# Patient Record
Sex: FEMALE | Race: WHITE | NOT HISPANIC OR LATINO | Employment: OTHER | URBAN - METROPOLITAN AREA
[De-identification: names, ages, dates, MRNs, and addresses within clinical notes are randomized per-mention and may not be internally consistent; named-entity substitution may affect disease eponyms.]

---

## 2017-05-17 ENCOUNTER — TRANSCRIBE ORDERS (OUTPATIENT)
Dept: ADMINISTRATIVE | Facility: HOSPITAL | Age: 52
End: 2017-05-17

## 2017-05-17 DIAGNOSIS — Z12.31 VISIT FOR SCREENING MAMMOGRAM: Primary | ICD-10-CM

## 2017-05-24 ENCOUNTER — HOSPITAL ENCOUNTER (OUTPATIENT)
Dept: MAMMOGRAPHY | Facility: HOSPITAL | Age: 52
Discharge: HOME | End: 2017-05-24
Attending: OBSTETRICS & GYNECOLOGY | Admitting: OBSTETRICS & GYNECOLOGY

## 2017-05-24 DIAGNOSIS — Z12.31 VISIT FOR SCREENING MAMMOGRAM: ICD-10-CM

## 2017-05-24 PROCEDURE — G0202 SCR MAMMO BI INCL CAD: HCPCS

## 2017-05-24 PROCEDURE — 77063 BREAST TOMOSYNTHESIS BI: CPT

## 2017-05-24 PROCEDURE — 77067 SCR MAMMO BI INCL CAD: CPT | Performed by: RADIOLOGY

## 2017-05-24 PROCEDURE — 77063 BREAST TOMOSYNTHESIS BI: CPT | Performed by: RADIOLOGY

## 2018-04-25 ENCOUNTER — TRANSCRIBE ORDERS (OUTPATIENT)
Dept: ADMINISTRATIVE | Facility: HOSPITAL | Age: 53
End: 2018-04-25

## 2018-04-25 DIAGNOSIS — Z12.31 VISIT FOR SCREENING MAMMOGRAM: Primary | ICD-10-CM

## 2018-05-25 ENCOUNTER — HOSPITAL ENCOUNTER (OUTPATIENT)
Dept: MAMMOGRAPHY | Facility: HOSPITAL | Age: 53
Discharge: HOME | End: 2018-05-25
Admitting: FAMILY MEDICINE

## 2018-05-25 DIAGNOSIS — Z12.31 VISIT FOR SCREENING MAMMOGRAM: ICD-10-CM

## 2018-05-25 PROCEDURE — 77063 BREAST TOMOSYNTHESIS BI: CPT

## 2018-05-25 PROCEDURE — 77067 SCR MAMMO BI INCL CAD: CPT | Performed by: RADIOLOGY

## 2018-05-25 PROCEDURE — 77067 SCR MAMMO BI INCL CAD: CPT

## 2018-05-25 PROCEDURE — 77063 BREAST TOMOSYNTHESIS BI: CPT | Performed by: RADIOLOGY

## 2019-01-13 ENCOUNTER — OFFICE VISIT (OUTPATIENT)
Dept: RETAIL CLINIC | Facility: CLINIC | Age: 54
End: 2019-01-13

## 2019-01-13 VITALS
SYSTOLIC BLOOD PRESSURE: 110 MMHG | WEIGHT: 114 LBS | DIASTOLIC BLOOD PRESSURE: 70 MMHG | TEMPERATURE: 97.4 F | HEIGHT: 64 IN | RESPIRATION RATE: 12 BRPM | OXYGEN SATURATION: 98 % | BODY MASS INDEX: 19.46 KG/M2 | HEART RATE: 100 BPM

## 2019-01-13 DIAGNOSIS — J01.41 ACUTE RECURRENT PANSINUSITIS: Primary | ICD-10-CM

## 2019-01-13 PROCEDURE — 99213 OFFICE O/P EST LOW 20 MIN: CPT | Performed by: NURSE PRACTITIONER

## 2019-01-13 RX ORDER — PSEUDOEPHEDRINE HCL 120 MG/1
120 TABLET, FILM COATED, EXTENDED RELEASE ORAL EVERY 12 HOURS
Qty: 20 TABLET | Refills: 0 | Status: SHIPPED | OUTPATIENT
Start: 2019-01-13 | End: 2019-01-23

## 2019-01-13 RX ORDER — PREDNISONE 10 MG/1
TABLET ORAL
Qty: 21 TABLET | Refills: 0 | Status: SHIPPED | OUTPATIENT
Start: 2019-01-13 | End: 2019-11-20

## 2019-01-13 RX ORDER — AMOXICILLIN 500 MG/1
1000 TABLET, FILM COATED ORAL 2 TIMES DAILY
Qty: 40 TABLET | Refills: 0 | Status: SHIPPED | OUTPATIENT
Start: 2019-01-13 | End: 2019-01-23

## 2019-01-13 NOTE — PROGRESS NOTES
"Subjective   Toya Apple is a 53 y.o. female.     Sinus Problem   This is a recurrent problem. Episode onset: 2 weeks. The problem has been gradually worsening since onset. There has been no fever. The pain is severe. Associated symptoms include congestion, headaches, sinus pressure and swollen glands. Pertinent negatives include no chills, coughing, ear pain, hoarse voice, neck pain, shortness of breath, sneezing or sore throat. Past treatments include nothing.        The following portions of the patient's history were reviewed and updated as appropriate: allergies, current medications, past medical history, past social history, past surgical history and problem list.    Review of Systems   Constitutional: Negative for appetite change, chills and fever.   HENT: Positive for congestion, postnasal drip, rhinorrhea, sinus pressure and sinus pain. Negative for ear pain, hoarse voice, sneezing, sore throat and trouble swallowing.    Eyes: Negative.    Respiratory: Negative for cough, shortness of breath and wheezing.    Cardiovascular: Negative.    Gastrointestinal: Negative for abdominal pain, diarrhea, nausea and vomiting.   Musculoskeletal: Negative.  Negative for neck pain.   Skin: Negative.    Neurological: Positive for headaches. Negative for dizziness.   Hematological: Positive for adenopathy.        /70   Pulse 100   Temp 97.4 °F (36.3 °C) (Oral)   Resp 12   Ht 162.6 cm (64\")   Wt 51.7 kg (114 lb)   LMP  (LMP Unknown)   SpO2 98%   BMI 19.57 kg/m²      Objective   Physical Exam   Constitutional: She is oriented to person, place, and time. Vital signs are normal. She appears well-developed and well-nourished.   HENT:   Head: Normocephalic.   Right Ear: External ear and ear canal normal. No drainage, swelling or tenderness. Tympanic membrane is bulging. Tympanic membrane is not erythematous.   Left Ear: External ear and ear canal normal. No drainage, swelling or tenderness. Tympanic membrane is " bulging. Tympanic membrane is not erythematous.   Nose: Mucosal edema and rhinorrhea present. Right sinus exhibits maxillary sinus tenderness and frontal sinus tenderness. Left sinus exhibits maxillary sinus tenderness and frontal sinus tenderness.   Mouth/Throat: Uvula is midline, oropharynx is clear and moist and mucous membranes are normal. Tonsils are 0 on the right. Tonsils are 0 on the left. No tonsillar exudate.   Eyes: Conjunctivae are normal. Pupils are equal, round, and reactive to light.   Cardiovascular: Normal rate, regular rhythm, S1 normal, S2 normal and normal heart sounds.   Pulmonary/Chest: Effort normal and breath sounds normal. No respiratory distress. She has no wheezes.   Lymphadenopathy:        Head (right side): Tonsillar adenopathy present.        Head (left side): Tonsillar adenopathy present.     She has no cervical adenopathy.   Neurological: She is alert and oriented to person, place, and time.   Skin: Skin is warm, dry and intact. No rash noted.   Psychiatric: She has a normal mood and affect. Her speech is normal and behavior is normal. Thought content normal.   Vitals reviewed.       Assessment/Plan   Toya was seen today for sinus problem.    Diagnoses and all orders for this visit:    Acute recurrent pansinusitis  -     amoxicillin (AMOXIL) 500 MG tablet; Take 2 tablets by mouth 2 (Two) Times a Day for 10 days.  -     pseudoephedrine (SUDAFED) 120 MG 12 hr tablet; Take 1 tablet by mouth Every 12 (Twelve) Hours for 10 days.  -     predniSONE (DELTASONE) 10 MG tablet; 6 tabs po x 1 day/5/4/3/2/1/stop; tapering dose x 6 days

## 2019-04-18 ENCOUNTER — TRANSCRIBE ORDERS (OUTPATIENT)
Dept: ADMINISTRATIVE | Facility: HOSPITAL | Age: 54
End: 2019-04-18

## 2019-04-18 DIAGNOSIS — Z12.31 VISIT FOR SCREENING MAMMOGRAM: Primary | ICD-10-CM

## 2019-05-29 ENCOUNTER — HOSPITAL ENCOUNTER (OUTPATIENT)
Dept: MAMMOGRAPHY | Facility: HOSPITAL | Age: 54
Discharge: HOME | End: 2019-05-29
Admitting: FAMILY MEDICINE

## 2019-05-29 DIAGNOSIS — Z12.31 VISIT FOR SCREENING MAMMOGRAM: ICD-10-CM

## 2019-05-29 PROCEDURE — 77063 BREAST TOMOSYNTHESIS BI: CPT | Performed by: RADIOLOGY

## 2019-05-29 PROCEDURE — 77063 BREAST TOMOSYNTHESIS BI: CPT

## 2019-05-29 PROCEDURE — 77067 SCR MAMMO BI INCL CAD: CPT | Performed by: RADIOLOGY

## 2019-05-29 PROCEDURE — 77067 SCR MAMMO BI INCL CAD: CPT

## 2019-11-20 ENCOUNTER — OFFICE VISIT (OUTPATIENT)
Dept: ORTHOPEDIC SURGERY | Facility: CLINIC | Age: 54
End: 2019-11-20

## 2019-11-20 VITALS — HEART RATE: 85 BPM | HEIGHT: 64 IN | WEIGHT: 105.82 LBS | OXYGEN SATURATION: 98 % | BODY MASS INDEX: 18.07 KG/M2

## 2019-11-20 DIAGNOSIS — M77.42 METATARSALGIA OF BOTH FEET: Primary | ICD-10-CM

## 2019-11-20 DIAGNOSIS — M72.2 PLANTAR FASCIITIS: ICD-10-CM

## 2019-11-20 DIAGNOSIS — M77.41 METATARSALGIA OF BOTH FEET: Primary | ICD-10-CM

## 2019-11-20 PROCEDURE — 99204 OFFICE O/P NEW MOD 45 MIN: CPT | Performed by: ORTHOPAEDIC SURGERY

## 2019-11-20 NOTE — PROGRESS NOTES
NEW PATIENT    Patient: Toya Apple  : 1965    Primary Care Provider: Brian Tillman MD    Requesting Provider: As above    Pain of the Right Foot      History    Chief Complaint: Bilateral foot pain    History of Present Illness: This is an extremely pleasant 54-year-old woman here for a 4th opinion regarding bilateral forefoot pain.  She reports that the problem started in 2018.  It started with pain on the right, under the metatarsal heads.  She did not have any particular injury.  She is a triathlete, and initially continued training in all 3 sports.  She tried some metatarsal pads that she actually stuck on her foot, and they helped initially.  But by May she was much worse.  She had much more pain barefoot, better but not resolved with padding.  She stopped running in .  She has continued to swim and bike.  She saw podiatrist in , who gave her some inserts, but they had a very thin pad under the metatarsal heads and did not help.  X-rays at that time standing 3 views of the foot done 2019 are unremarkable, I reviewed them on a disc.  She had an MRI done on 2019.  I reviewed the films and the report.  There is very subtle edema in the second metatarsal head, consistent with stress there, but not a stress fracture, no tears in the plantar plate, this is consistent with metatarsalgia.  She had an injection in the first webspace, she noted some skin atrophy and dorsal fat atrophy after that.  The injection did not change her symptoms.  In August she saw Dr. Gordo Colon at  in sports medicine.  And he recommended orthotics.  She has been advised that she has a very thin fat pad on the feet.  She also has tried dry needling, physical therapy, icing and acupuncture.  She also saw Dr. Crow in  sports medicine.  He recommended orthotics for a month, and then he would consider repeating the MRI.  She got new orthotics at the [facility] [place], but 3 days later noted  some right heel pain.  She thought it might be due to the orthotics so she changed back to the older ones which had been modified to place more padding and more metatarsal posting.  Those of the most comfortable.  She does not have morning pain, the heel pain is in the area of the origin of the plantar fascia.  She would like to be able to run again, she would like to know if there is anything else she can do, she would like to know if she needs a repeat MRI.  She has similar but milder pain in the left forefoot.  Pain is under metatarsal heads 2 through 5, but most concentrated under the second and third.  She has not had any swelling, no significant hammertoe deformity.  She does not have tight hamstrings and heel cords.  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    Current Outpatient Medications on File Prior to Visit   Medication Sig Dispense Refill   • [DISCONTINUED] predniSONE (DELTASONE) 10 MG tablet 6 tabs po x 1 day/5/4/3/2/1/stop; tapering dose x 6 days 21 tablet 0     No current facility-administered medications on file prior to visit.       No Known Allergies   History reviewed. No pertinent past medical history.  History reviewed. No pertinent surgical history.  Family History   Adopted: Yes   Problem Relation Age of Onset   • No Known Problems Mother    • No Known Problems Father    • Breast cancer Neg Hx    • Ovarian cancer Neg Hx    • Endometrial cancer Neg Hx       Social History     Socioeconomic History   • Marital status:      Spouse name: Not on file   • Number of children: Not on file   • Years of education: Not on file   • Highest education level: Not on file   Tobacco Use   • Smoking status: Never Smoker   • Smokeless tobacco: Never Used   Substance and Sexual Activity   • Alcohol use: No     Frequency: Never   • Drug use: No   • Sexual activity: Defer        Review of Systems   Constitutional: Positive for activity change.   HENT:  "Negative.    Eyes: Negative.    Respiratory: Negative.    Cardiovascular: Negative.    Gastrointestinal: Negative.    Endocrine: Negative.    Genitourinary: Negative.    Musculoskeletal: Positive for joint swelling.   Skin: Negative.    Allergic/Immunologic: Negative.    Neurological: Negative.    Hematological: Negative.    Psychiatric/Behavioral: Negative.        The following portions of the patient's history were reviewed and updated as appropriate: allergies, current medications, past family history, past medical history, past social history, past surgical history and problem list.    Physical Exam:   Pulse 85   Ht 162.6 cm (64.02\")   Wt 48 kg (105 lb 13.1 oz)   LMP  (LMP Unknown)   SpO2 98%   BMI 18.15 kg/m²   GENERAL: Body habitus: Very thin    Lower extremity edema: Right: none; Left: none    Varicose veins:  Right: none; Left: none    Gait: normal     Mental Status:  awake and alert; oriented to person, place, and time    Voice:  clear  SKIN:  Lower extremity: Normal and warm and dry    Hair Growth(lower extremity):  Right:normal; Left:  normal  NAILS: Toenails: normal  HEENT: Head: Normocephalic, atraumatic,  without obvious abnormality.  eye: normal external eye, no icterus  ears:normal external ears  nose: normal external nose  pharynx: dental hygiene adequate  PULM:  Repiratory effort normal  CV:  Dorsalis Pedis:  Right: 2+; Left:2+    Posterior Tibial: Right:2+; Left:2+    Capillary Refill:  Brisk  MSK:  Hand:right handed      Tibia:  Right:  non tender; Left:  non tender      Ankle:  Right: non tender, ROM  normal and symmetric and motor function  normal; Left:  non tender, ROM  normal and symmetric and motor function  normal      Foot:  Right:  Tender under metatarsal heads 2 through 5, especially tender under the second and third, no swelling, no hammertoe deformity, very thin fat pad.  Also mildly tender at the origin of the plantar fascia.  No pain with squeeze of the heel.  Otherwise " "nontender; Left:  Tender under the metatarsal heads, especially 2 and 3, no swelling, no hammertoes, very thin atrophic fat pad    NEURO: Heel Walking:  Right:  normal; Left:  normal    Toe Walking:  Right:  Able to do this with pain under the metatarsal heads; Left:  Able to do this with mild pain under the metatarsal heads     Kirkwood-Pito 5.07 monofilament test: normal    Lower extremity sensation: intact     Reflexes:  Biceps:  Right:  2+; Left:  2+           Quads:  Right:  2+; Left:  2+           Ankle:  Right:  1+; Left:  1+      Calf Atrophy:none    Motor Function: all 5/5         Medical Decision Making    Data Review:   reviewed radiology images, reviewed radiology results and reviewed outside records    Assessment and Plan/ Diagnosis/Treatment options:   1. Metatarsalgia of both feet  I definitely agree this is metatarsalgia.  This is not synovitis, I do not think it is early hammertoe deformity, I think it is metatarsalgia due to a very thin fat pad.  I explained that our feet change with time.  Losing the fat pad is most likely due to genetics.  I explained that some people lose the padding more than others.  It may be that she is essentially \"stuck with this\".  I explained there is no way to increase padding in the foot, various fillers have been tried with no success.  The best way to do this is with external padding.  It may mean that she needs to try quite a few different brands of shoes and pads.  I showed her how to use a felt metatarsal pad in the shoe rather than on her foot.  I also showed her how to use a Budin splint, one loop or 2 loops.  Its unfortunately possible that she might not be able to get back to running.  She has some new [employer] shoes coming, that certainly reasonable to try, and she was thinking about trying the brand of shoes that the marathon runner who came in under 2 hours wears.  We also talked about gel pads.  There is no surgery that I have that can change this for her.  " I do not think that a repeat MRI will be useful.  She agrees, she would rather avoid the expense.  We talked about other ways to cross train.  I evaluated her orthotics in the shoes she brought.  She might consider having the metatarsal pads moved a little bit more distally.    2. Plantar fasciitis  I think the heel pain is plantar fasciitis.  Its not entirely classic but we do not always have all the symptoms.  I recommended stretching and went over the patient information sheet with her.  If she develops morning pain I would recommend that she obtain a night splint.  I will be happy to see her anytime.            Radiology Ordered []  Radiology Reports Reviewed []      Radiology Images Reviewed []   Labs Reviewed []    Labs Ordered []   PCP Records Reviewed []    Provider Records Reviewed []    ER Records Reviewed []    Hospital Records Reviewed []    History Obtained From Family []    Phone conversation with Provider []    Records Requested []

## 2020-05-08 ENCOUNTER — HOSPITAL ENCOUNTER (EMERGENCY)
Facility: HOSPITAL | Age: 55
Discharge: HOME | End: 2020-05-08
Attending: EMERGENCY MEDICINE | Admitting: EMERGENCY MEDICINE

## 2020-05-08 VITALS
SYSTOLIC BLOOD PRESSURE: 140 MMHG | TEMPERATURE: 99.5 F | HEART RATE: 80 BPM | HEIGHT: 64 IN | DIASTOLIC BLOOD PRESSURE: 92 MMHG | WEIGHT: 108 LBS | RESPIRATION RATE: 14 BRPM | BODY MASS INDEX: 18.44 KG/M2 | OXYGEN SATURATION: 94 %

## 2020-05-08 DIAGNOSIS — R06.4 HYPERVENTILATION: ICD-10-CM

## 2020-05-08 DIAGNOSIS — R03.0 ELEVATED BLOOD-PRESSURE READING WITHOUT DIAGNOSIS OF HYPERTENSION: ICD-10-CM

## 2020-05-08 DIAGNOSIS — R06.02 SHORTNESS OF BREATH: Primary | ICD-10-CM

## 2020-05-08 PROCEDURE — 99283 EMERGENCY DEPT VISIT LOW MDM: CPT

## 2020-05-08 NOTE — ED PROVIDER NOTES
Subjective   Mrs. Apple presents with difficulty breathing, diffuse numbness, and anxiety.  She tells me her dog ran into her leg a couple of days ago.  She saw Dr. Zheng's assistant yesterday and had a CAT scan which she tells me shows tibial plateau fracture.  She was prescribed oxycodone for pain.  Her last dose was 2:00 this morning.  She was supposed to see him this morning but notes that in the car on the way she was breathing very rapidly and felt numb and tingly all over.  She tells me her breathing has settled down somewhat.  She denies any chronic medical problems.  She denies any other injuries as a result of her collision with the dog.  She specifically denies any head injury.      History provided by:  Patient  Shortness of Breath   Severity:  Severe  Onset quality:  Sudden  Timing:  Intermittent  Progression:  Resolved  Chronicity:  New  Relieved by:  Nothing  Worsened by:  Nothing  Associated symptoms: no abdominal pain, no chest pain, no cough, no fever, no sore throat and no vomiting        Review of Systems   Constitutional: Negative for chills and fever.   HENT: Negative for congestion, rhinorrhea and sore throat.    Respiratory: Positive for shortness of breath. Negative for cough.    Cardiovascular: Negative for chest pain.   Gastrointestinal: Negative for abdominal pain and vomiting.   Neurological: Positive for dizziness and numbness.   Psychiatric/Behavioral: The patient is nervous/anxious.        No past medical history on file.    No Known Allergies    No past surgical history on file.    Family History   Adopted: Yes   Problem Relation Age of Onset   • No Known Problems Mother    • No Known Problems Father    • Breast cancer Neg Hx    • Ovarian cancer Neg Hx    • Endometrial cancer Neg Hx        Social History     Socioeconomic History   • Marital status:      Spouse name: Not on file   • Number of children: Not on file   • Years of education: Not on file   • Highest education  level: Not on file   Tobacco Use   • Smoking status: Never Smoker   • Smokeless tobacco: Never Used   Substance and Sexual Activity   • Alcohol use: No     Frequency: Never   • Drug use: No   • Sexual activity: Defer           Objective   Physical Exam   Constitutional: She appears well-developed and well-nourished. No distress.   HENT:   Head: Normocephalic and atraumatic.   Eyes: Conjunctivae are normal. No scleral icterus.   Neck: Normal range of motion. Neck supple. No JVD present.   Cardiovascular: Normal rate and regular rhythm. Exam reveals no friction rub.   No murmur heard.  Pulmonary/Chest: Effort normal and breath sounds normal. No stridor. No respiratory distress. She has no wheezes. She has no rales.   Musculoskeletal: She exhibits no edema or tenderness.   Her left leg is in a knee immobilizer.  Sensation is intact distally.  Capillary refill is a little delayed in both feet but is symmetric.  There is no calf tenderness or swelling on either side.   Neurological: She is alert.   Skin: Skin is warm and dry. No pallor.   Nursing note and vitals reviewed.      Procedures           ED Course  ED Course as of May 08 0952   Fri May 08, 2020   0927 I viewed the disc that Mrs. Apple brought with her.  There is no report.  There are only 2 poor quality  images.  I do not see an obvious fracture on those.    [DT]   0927 I spoke with Mrs. Apple about my plan to perform EKG, blood work and possibly CAT scan.  She tells me she does not want any of that done.  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  She tells me it is all coming from the pain in her leg.  I offered medication for anxiety and she declined.  I offered pain medication and she declined.  I advised her of the possibility that this could be related to oxycodone and she feels like she was having similar episodes before she took that.  When I asked what we can do for her currently as she would not  allow us to do any further work-up she tells me she just wants to be discharged so she can go see Dr. coleman.    [DT]      ED Course User Index  [DT] Rufus Monge MD                                           [facility]    Final diagnoses:   Shortness of breath   Hyperventilation   Elevated blood-pressure reading without diagnosis of hypertension            Rufus Mogne MD  05/08/20 0995

## 2020-05-12 ENCOUNTER — OFFICE VISIT (OUTPATIENT)
Dept: PREADMISSION TESTING | Facility: HOSPITAL | Age: 55
End: 2020-05-12

## 2020-05-12 PROCEDURE — C9803 HOPD COVID-19 SPEC COLLECT: HCPCS

## 2020-05-12 PROCEDURE — U0002 COVID-19 LAB TEST NON-CDC: HCPCS

## 2020-05-12 PROCEDURE — U0004 COV-19 TEST NON-CDC HGH THRU: HCPCS

## 2020-05-13 ENCOUNTER — ANESTHESIA EVENT (OUTPATIENT)
Dept: PERIOP | Facility: HOSPITAL | Age: 55
End: 2020-05-13

## 2020-05-13 LAB
REF LAB TEST METHOD: NORMAL
SARS-COV-2 RNA RESP QL NAA+PROBE: NOT DETECTED

## 2020-05-14 ENCOUNTER — HOSPITAL ENCOUNTER (OUTPATIENT)
Facility: HOSPITAL | Age: 55
Setting detail: SURGERY ADMIT
Discharge: HOME | End: 2020-05-14
Attending: ORTHOPAEDIC SURGERY | Admitting: ORTHOPAEDIC SURGERY

## 2020-05-14 ENCOUNTER — ANESTHESIA (OUTPATIENT)
Dept: PERIOP | Facility: HOSPITAL | Age: 55
End: 2020-05-14

## 2020-05-14 ENCOUNTER — APPOINTMENT (OUTPATIENT)
Dept: GENERAL RADIOLOGY | Facility: HOSPITAL | Age: 55
End: 2020-05-14

## 2020-05-14 VITALS
WEIGHT: 108 LBS | OXYGEN SATURATION: 99 % | HEIGHT: 64 IN | SYSTOLIC BLOOD PRESSURE: 118 MMHG | HEART RATE: 83 BPM | DIASTOLIC BLOOD PRESSURE: 53 MMHG | BODY MASS INDEX: 18.44 KG/M2 | TEMPERATURE: 98 F | RESPIRATION RATE: 16 BRPM

## 2020-05-14 DIAGNOSIS — S82.122A CLOSED FRACTURE OF LATERAL PORTION OF LEFT TIBIAL PLATEAU, INITIAL ENCOUNTER: Primary | ICD-10-CM

## 2020-05-14 LAB
B-HCG UR QL: NEGATIVE
DEPRECATED RDW RBC AUTO: 37.9 FL (ref 37–54)
ERYTHROCYTE [DISTWIDTH] IN BLOOD BY AUTOMATED COUNT: 11.3 % (ref 12.3–15.4)
HCT VFR BLD AUTO: 43.2 % (ref 34–46.6)
HGB BLD-MCNC: 15 G/DL (ref 12–15.9)
INTERNAL NEGATIVE CONTROL: NEGATIVE
INTERNAL POSITIVE CONTROL: POSITIVE
Lab: NORMAL
MCH RBC QN AUTO: 31.6 PG (ref 26.6–33)
MCHC RBC AUTO-ENTMCNC: 34.7 G/DL (ref 31.5–35.7)
MCV RBC AUTO: 90.9 FL (ref 79–97)
PLATELET # BLD AUTO: 217 10*3/MM3 (ref 140–450)
PMV BLD AUTO: 9.7 FL (ref 6–12)
RBC # BLD AUTO: 4.75 10*6/MM3 (ref 3.77–5.28)
WBC NRBC COR # BLD: 5.21 10*3/MM3 (ref 3.4–10.8)

## 2020-05-14 PROCEDURE — 25010000002 FENTANYL CITRATE (PF) 100 MCG/2ML SOLUTION: Performed by: ANESTHESIOLOGY

## 2020-05-14 PROCEDURE — 25010000002 PROPOFOL 10 MG/ML EMULSION: Performed by: NURSE ANESTHETIST, CERTIFIED REGISTERED

## 2020-05-14 PROCEDURE — L1833 KO ADJ JNT POS R SUP PRE OTS: HCPCS | Performed by: ORTHOPAEDIC SURGERY

## 2020-05-14 PROCEDURE — C1713 ANCHOR/SCREW BN/BN,TIS/BN: HCPCS | Performed by: ORTHOPAEDIC SURGERY

## 2020-05-14 PROCEDURE — 76000 FLUOROSCOPY <1 HR PHYS/QHP: CPT

## 2020-05-14 PROCEDURE — 25010000002 ONDANSETRON PER 1 MG: Performed by: NURSE ANESTHETIST, CERTIFIED REGISTERED

## 2020-05-14 PROCEDURE — 25010000002 DEXAMETHASONE SODIUM PHOSPHATE 10 MG/ML SOLUTION: Performed by: ANESTHESIOLOGY

## 2020-05-14 PROCEDURE — C1769 GUIDE WIRE: HCPCS | Performed by: ORTHOPAEDIC SURGERY

## 2020-05-14 PROCEDURE — 25010000002 BUPRENORPHINE PER 0.1 MG: Performed by: ANESTHESIOLOGY

## 2020-05-14 PROCEDURE — 85027 COMPLETE CBC AUTOMATED: CPT | Performed by: ANESTHESIOLOGY

## 2020-05-14 PROCEDURE — 81025 URINE PREGNANCY TEST: CPT | Performed by: ANESTHESIOLOGY

## 2020-05-14 PROCEDURE — 25010000002 ROPIVACAINE PER 1 MG: Performed by: NURSE ANESTHETIST, CERTIFIED REGISTERED

## 2020-05-14 PROCEDURE — 25010000002 DEXAMETHASONE PER 1 MG: Performed by: NURSE ANESTHETIST, CERTIFIED REGISTERED

## 2020-05-14 PROCEDURE — 25010000003 CEFAZOLIN IN DEXTROSE 2-4 GM/100ML-% SOLUTION: Performed by: ORTHOPAEDIC SURGERY

## 2020-05-14 DEVICE — SUT FW #2 W/TPR NDL 1/2 CIR 38IN 97CM 26.5MM BLU: Type: IMPLANTABLE DEVICE | Site: TIBIA | Status: FUNCTIONAL

## 2020-05-14 DEVICE — DEV CONTRL TISS STRATAFIX SPIRAL MNCRYL UD 3/0 PLS 30CM: Type: IMPLANTABLE DEVICE | Site: TIBIA | Status: FUNCTIONAL

## 2020-05-14 DEVICE — SCRW CORT S/TAP 3.5X36MM: Type: IMPLANTABLE DEVICE | Site: TIBIA | Status: FUNCTIONAL

## 2020-05-14 DEVICE — IMPLANTABLE DEVICE: Type: IMPLANTABLE DEVICE | Site: TIBIA | Status: FUNCTIONAL

## 2020-05-14 DEVICE — SCRW LK VA/LCP S/TAP STRDRV 3.5X56MM: Type: IMPLANTABLE DEVICE | Site: TIBIA | Status: FUNCTIONAL

## 2020-05-14 DEVICE — BONE FILL NORIAN INJ/DRL 5CC STRL: Type: IMPLANTABLE DEVICE | Site: TIBIA | Status: FUNCTIONAL

## 2020-05-14 DEVICE — SCRW LK VA/LCP S/TAP STRDRV 3.5X60MM: Type: IMPLANTABLE DEVICE | Site: TIBIA | Status: FUNCTIONAL

## 2020-05-14 DEVICE — SCRW CORT S/TAP 3.5X40MM: Type: IMPLANTABLE DEVICE | Site: TIBIA | Status: FUNCTIONAL

## 2020-05-14 DEVICE — SCRW LK VA/LCP S/TAP STRDRV 3.5X65MM: Type: IMPLANTABLE DEVICE | Site: TIBIA | Status: FUNCTIONAL

## 2020-05-14 DEVICE — SCRW LK VA/LCP S/TAP STRDRV 3.5X50MM: Type: IMPLANTABLE DEVICE | Site: TIBIA | Status: FUNCTIONAL

## 2020-05-14 DEVICE — SCRW LK VA/LCP S/TAP STRDRV 3.5X70MM: Type: IMPLANTABLE DEVICE | Site: TIBIA | Status: FUNCTIONAL

## 2020-05-14 RX ORDER — PROPOFOL 10 MG/ML
VIAL (ML) INTRAVENOUS AS NEEDED
Status: DISCONTINUED | OUTPATIENT
Start: 2020-05-14 | End: 2020-05-14 | Stop reason: SURG

## 2020-05-14 RX ORDER — SODIUM CHLORIDE 0.9 % (FLUSH) 0.9 %
10 SYRINGE (ML) INJECTION AS NEEDED
Status: DISCONTINUED | OUTPATIENT
Start: 2020-05-14 | End: 2020-05-14 | Stop reason: HOSPADM

## 2020-05-14 RX ORDER — DEXAMETHASONE SODIUM PHOSPHATE 4 MG/ML
INJECTION, SOLUTION INTRA-ARTICULAR; INTRALESIONAL; INTRAMUSCULAR; INTRAVENOUS; SOFT TISSUE AS NEEDED
Status: DISCONTINUED | OUTPATIENT
Start: 2020-05-14 | End: 2020-05-14 | Stop reason: SURG

## 2020-05-14 RX ORDER — DEXAMETHASONE SODIUM PHOSPHATE 10 MG/ML
INJECTION, SOLUTION INTRAMUSCULAR; INTRAVENOUS
Status: COMPLETED | OUTPATIENT
Start: 2020-05-14 | End: 2020-05-14

## 2020-05-14 RX ORDER — FENTANYL CITRATE 50 UG/ML
50 INJECTION, SOLUTION INTRAMUSCULAR; INTRAVENOUS
Status: DISCONTINUED | OUTPATIENT
Start: 2020-05-14 | End: 2020-05-14 | Stop reason: HOSPADM

## 2020-05-14 RX ORDER — FAMOTIDINE 20 MG/1
20 TABLET, FILM COATED ORAL ONCE
Status: COMPLETED | OUTPATIENT
Start: 2020-05-14 | End: 2020-05-14

## 2020-05-14 RX ORDER — ASPIRIN 325 MG
325 TABLET, DELAYED RELEASE (ENTERIC COATED) ORAL DAILY
Qty: 14 TABLET | Refills: 0 | Status: SHIPPED | OUTPATIENT
Start: 2020-05-14 | End: 2021-12-02

## 2020-05-14 RX ORDER — OXYCODONE HCL 10 MG/1
5 TABLET, FILM COATED, EXTENDED RELEASE ORAL EVERY 12 HOURS
COMMUNITY
End: 2021-12-02

## 2020-05-14 RX ORDER — PROMETHAZINE HYDROCHLORIDE 25 MG/ML
6.25 INJECTION, SOLUTION INTRAMUSCULAR; INTRAVENOUS ONCE AS NEEDED
Status: DISCONTINUED | OUTPATIENT
Start: 2020-05-14 | End: 2020-05-14 | Stop reason: HOSPADM

## 2020-05-14 RX ORDER — SODIUM CHLORIDE, SODIUM LACTATE, POTASSIUM CHLORIDE, CALCIUM CHLORIDE 600; 310; 30; 20 MG/100ML; MG/100ML; MG/100ML; MG/100ML
9 INJECTION, SOLUTION INTRAVENOUS CONTINUOUS
Status: DISCONTINUED | OUTPATIENT
Start: 2020-05-14 | End: 2020-05-14 | Stop reason: HOSPADM

## 2020-05-14 RX ORDER — BUPRENORPHINE HYDROCHLORIDE 0.32 MG/ML
INJECTION INTRAMUSCULAR; INTRAVENOUS
Status: COMPLETED | OUTPATIENT
Start: 2020-05-14 | End: 2020-05-14

## 2020-05-14 RX ORDER — HYDROCODONE BITARTRATE AND ACETAMINOPHEN 7.5; 325 MG/1; MG/1
1-2 TABLET ORAL EVERY 4 HOURS PRN
Qty: 30 TABLET | Refills: 0 | Status: SHIPPED | OUTPATIENT
Start: 2020-05-14 | End: 2021-12-02

## 2020-05-14 RX ORDER — ONDANSETRON 2 MG/ML
INJECTION INTRAMUSCULAR; INTRAVENOUS AS NEEDED
Status: DISCONTINUED | OUTPATIENT
Start: 2020-05-14 | End: 2020-05-14 | Stop reason: SURG

## 2020-05-14 RX ORDER — HYDROMORPHONE HYDROCHLORIDE 1 MG/ML
0.5 INJECTION, SOLUTION INTRAMUSCULAR; INTRAVENOUS; SUBCUTANEOUS
Status: DISCONTINUED | OUTPATIENT
Start: 2020-05-14 | End: 2020-05-14 | Stop reason: HOSPADM

## 2020-05-14 RX ORDER — BUPIVACAINE HYDROCHLORIDE 2.5 MG/ML
INJECTION, SOLUTION EPIDURAL; INFILTRATION; INTRACAUDAL
Status: COMPLETED | OUTPATIENT
Start: 2020-05-14 | End: 2020-05-14

## 2020-05-14 RX ORDER — CEFAZOLIN SODIUM 2 G/100ML
2 INJECTION, SOLUTION INTRAVENOUS ONCE
Status: COMPLETED | OUTPATIENT
Start: 2020-05-14 | End: 2020-05-14

## 2020-05-14 RX ORDER — PROMETHAZINE HYDROCHLORIDE 25 MG/1
25 SUPPOSITORY RECTAL ONCE AS NEEDED
Status: DISCONTINUED | OUTPATIENT
Start: 2020-05-14 | End: 2020-05-14 | Stop reason: HOSPADM

## 2020-05-14 RX ORDER — MAGNESIUM HYDROXIDE 1200 MG/15ML
LIQUID ORAL AS NEEDED
Status: DISCONTINUED | OUTPATIENT
Start: 2020-05-14 | End: 2020-05-14 | Stop reason: HOSPADM

## 2020-05-14 RX ORDER — PROMETHAZINE HYDROCHLORIDE 25 MG/1
25 TABLET ORAL ONCE AS NEEDED
Status: DISCONTINUED | OUTPATIENT
Start: 2020-05-14 | End: 2020-05-14 | Stop reason: HOSPADM

## 2020-05-14 RX ORDER — FAMOTIDINE 10 MG/ML
20 INJECTION, SOLUTION INTRAVENOUS ONCE
Status: DISCONTINUED | OUTPATIENT
Start: 2020-05-14 | End: 2020-05-14 | Stop reason: HOSPADM

## 2020-05-14 RX ORDER — LIDOCAINE HYDROCHLORIDE 10 MG/ML
INJECTION, SOLUTION EPIDURAL; INFILTRATION; INTRACAUDAL; PERINEURAL AS NEEDED
Status: DISCONTINUED | OUTPATIENT
Start: 2020-05-14 | End: 2020-05-14 | Stop reason: SURG

## 2020-05-14 RX ORDER — FENTANYL CITRATE 50 UG/ML
INJECTION, SOLUTION INTRAMUSCULAR; INTRAVENOUS
Status: COMPLETED | OUTPATIENT
Start: 2020-05-14 | End: 2020-05-14

## 2020-05-14 RX ORDER — LIDOCAINE HYDROCHLORIDE 10 MG/ML
0.5 INJECTION, SOLUTION EPIDURAL; INFILTRATION; INTRACAUDAL; PERINEURAL ONCE AS NEEDED
Status: COMPLETED | OUTPATIENT
Start: 2020-05-14 | End: 2020-05-14

## 2020-05-14 RX ORDER — SODIUM CHLORIDE 0.9 % (FLUSH) 0.9 %
10 SYRINGE (ML) INJECTION EVERY 12 HOURS SCHEDULED
Status: DISCONTINUED | OUTPATIENT
Start: 2020-05-14 | End: 2020-05-14 | Stop reason: HOSPADM

## 2020-05-14 RX ADMIN — LIDOCAINE HYDROCHLORIDE 30 MG: 10 INJECTION, SOLUTION EPIDURAL; INFILTRATION; INTRACAUDAL; PERINEURAL at 07:34

## 2020-05-14 RX ADMIN — FAMOTIDINE 20 MG: 20 TABLET, FILM COATED ORAL at 06:45

## 2020-05-14 RX ADMIN — EPHEDRINE SULFATE 10 MG: 50 INJECTION INTRAMUSCULAR; INTRAVENOUS; SUBCUTANEOUS at 07:42

## 2020-05-14 RX ADMIN — EPHEDRINE SULFATE 10 MG: 50 INJECTION INTRAMUSCULAR; INTRAVENOUS; SUBCUTANEOUS at 07:41

## 2020-05-14 RX ADMIN — ONDANSETRON 4 MG: 2 INJECTION INTRAMUSCULAR; INTRAVENOUS at 09:04

## 2020-05-14 RX ADMIN — PROPOFOL 100 MG: 10 INJECTION, EMULSION INTRAVENOUS at 07:34

## 2020-05-14 RX ADMIN — FENTANYL CITRATE 100 MCG: 50 INJECTION, SOLUTION INTRAMUSCULAR; INTRAVENOUS at 07:12

## 2020-05-14 RX ADMIN — BUPIVACAINE HYDROCHLORIDE 20 ML: 2.5 INJECTION, SOLUTION EPIDURAL; INFILTRATION; INTRACAUDAL; PERINEURAL at 07:16

## 2020-05-14 RX ADMIN — BUPIVACAINE HYDROCHLORIDE 20 ML: 2.5 INJECTION, SOLUTION EPIDURAL; INFILTRATION; INTRACAUDAL; PERINEURAL at 07:12

## 2020-05-14 RX ADMIN — EPHEDRINE SULFATE 10 MG: 50 INJECTION INTRAMUSCULAR; INTRAVENOUS; SUBCUTANEOUS at 07:38

## 2020-05-14 RX ADMIN — SODIUM CHLORIDE, POTASSIUM CHLORIDE, SODIUM LACTATE AND CALCIUM CHLORIDE 9 ML/HR: 600; 310; 30; 20 INJECTION, SOLUTION INTRAVENOUS at 06:45

## 2020-05-14 RX ADMIN — ROPIVACAINE HYDROCHLORIDE 6 ML/HR: 5 INJECTION, SOLUTION EPIDURAL; INFILTRATION; PERINEURAL at 10:12

## 2020-05-14 RX ADMIN — CEFAZOLIN SODIUM 2 G: 2 INJECTION, SOLUTION INTRAVENOUS at 07:28

## 2020-05-14 RX ADMIN — DEXAMETHASONE SODIUM PHOSPHATE 2 MG: 10 INJECTION INTRAMUSCULAR; INTRAVENOUS at 07:16

## 2020-05-14 RX ADMIN — BUPIVACAINE HYDROCHLORIDE 10 ML: 2.5 INJECTION, SOLUTION EPIDURAL; INFILTRATION; INTRACAUDAL; PERINEURAL at 10:15

## 2020-05-14 RX ADMIN — BUPRENORPHINE HYDROCHLORIDE 0.15 MG: 0.32 INJECTION INTRAMUSCULAR; INTRAVENOUS at 07:16

## 2020-05-14 RX ADMIN — DEXAMETHASONE SODIUM PHOSPHATE 2 MG: 10 INJECTION INTRAMUSCULAR; INTRAVENOUS at 07:12

## 2020-05-14 RX ADMIN — DEXAMETHASONE SODIUM PHOSPHATE 8 MG: 4 INJECTION, SOLUTION INTRAMUSCULAR; INTRAVENOUS at 07:41

## 2020-05-14 RX ADMIN — LIDOCAINE HYDROCHLORIDE 0.2 ML: 10 INJECTION, SOLUTION EPIDURAL; INFILTRATION; INTRACAUDAL; PERINEURAL at 06:45

## 2020-05-14 RX ADMIN — BUPRENORPHINE HYDROCHLORIDE 0.15 MG: 0.32 INJECTION INTRAMUSCULAR; INTRAVENOUS at 07:12

## 2020-05-14 NOTE — ANESTHESIA PREPROCEDURE EVALUATION
Anesthesia Evaluation     Patient summary reviewed and Nursing notes reviewed   NPO Solid Status: > 8 hours  NPO Liquid Status: > 2 hours           Airway   Mallampati: II  TM distance: >3 FB  Neck ROM: full  No difficulty expected  Dental - normal exam     Pulmonary - negative pulmonary ROS and normal exam    breath sounds clear to auscultation  Cardiovascular - negative cardio ROS and normal exam    Rhythm: regular  Rate: normal        Neuro/Psych- negative ROS  GI/Hepatic/Renal/Endo - negative ROS     Musculoskeletal         ROS comment: Left tibial plateau fx  Abdominal    Substance History - negative use     OB/GYN          Other - negative ROS                       Anesthesia Plan    ASA 1     general with block   (Single shot femoral/popliteal blocks pre-op with popliteal catheter at end of surgery for post-op analgesia per request of Dr. Zheng)  intravenous induction     Anesthetic plan, all risks, benefits, and alternatives have been provided, discussed and informed consent has been obtained with: patient.    Plan discussed with CRNA.

## 2020-05-14 NOTE — ANESTHESIA PROCEDURE NOTES
Airway  Urgency: elective    Date/Time: 5/14/2020 7:34 AM  Airway not difficult    General Information and Staff    Patient location during procedure: OR  CRNA: Katalina Richter CRNA    Indications and Patient Condition  Indications for airway management: airway protection    Preoxygenated: yes  MILS maintained throughout  Mask difficulty assessment: 0 - not attempted    Final Airway Details  Final airway type: supraglottic airway      Successful airway: I-gel  Size 3    Number of attempts at approach: 1  Assessment: lips, teeth, and gum same as pre-op and atraumatic intubation    Additional Comments  Pt to OR 11. Pt moved self to OR table. ASA monitors applied. Pre-O2 with 100%. SIVI. LMA placed atraumatic. +ETCO2. +BBS.

## 2020-05-14 NOTE — ANESTHESIA PROCEDURE NOTES
Left Femoral Single Shot       Patient reassessed immediately prior to procedure    Patient location during procedure: pre-op  Reason for block: post-op pain management  Performed by  Anesthesiologist: Manfred Simeon MD  CRNA: Katalina Richter CRNA  Assisted by: Brit Givens CRNA  Preanesthetic Checklist  Completed: patient identified, site marked, surgical consent, pre-op evaluation, timeout performed, IV checked, risks and benefits discussed and monitors and equipment checked  Prep:  Pt Position: supine  Sterile barriers:gloves, cap, sterile barriers and mask  Prep: ChloraPrep  Patient monitoring: blood pressure monitoring, continuous pulse oximetry and EKG  Procedure  Sedation:yes  Performed under: local infiltration  Guidance:ultrasound guided and landmark technique  Images:still images not obtained    Laterality:left  Block Type:femoral  Injection Technique:single-shot  Needle Type:short-bevel  Needle Gauge:20 G  Resistance on Injection: none    Medications Used: fentaNYL citrate (PF) (SUBLIMAZE) injection, 100 mcg  bupivacaine PF (MARCAINE) 0.25 % injection, 20 mL  buprenorphine (BUPRENEX) injection, 0.15 mg  dexamethasone sodium phosphate injection, 2 mg  Med admintered at 5/14/2020 7:12 AM      Post Assessment  Injection Assessment: negative aspiration for heme, no paresthesia on injection and incremental injection  Patient Tolerance:comfortable throughout block  Complications:no  Additional Notes  The BBRaun 360 degree echogenic needle was introduced in plane, in a lateral to medial direction at the level of the inguinal crease.  Under ultrasound guidance, the femoral artery and vein where located.  The needle was then directed below Fascia Iliacus towards the Femoral nerve.  NS was utilized to hydro dissect and morales needle advancement towards the target structure.   LA was injected incrementally in 3-5 ml aliquots with negative aspirate.  LA spread was visualized around the nerve, negative  intraneural injection, low injection pressures.  Thank you

## 2020-05-14 NOTE — ANESTHESIA PROCEDURE NOTES
Left Popliteal Single Shot      Patient reassessed immediately prior to procedure    Patient location during procedure: pre-op  Reason for block: at surgeon's request and post-op pain management  Performed by  Anesthesiologist: Manfred Simeon MD  CRNA: Katalina Richter CRNA  Assisted by: Brit Givens CRNA  Preanesthetic Checklist  Completed: patient identified, site marked, surgical consent, pre-op evaluation, timeout performed, IV checked, risks and benefits discussed and monitors and equipment checked  Prep:  Pt Position: right lateral decubitus  Sterile barriers:cap, gloves, mask and sterile barriers  Prep: ChloraPrep  Patient monitoring: blood pressure monitoring, continuous pulse oximetry and EKG  Procedure  Sedation:yes  Performed under: local infiltration  Guidance:ultrasound guided  Images:still images not obtained    Laterality:left  Block Type:popliteal  Injection Technique:single-shot  Needle Type:echogenic  Needle Gauge:20 G  Resistance on Injection: none    Medications Used: bupivacaine PF (MARCAINE) 0.25 % injection, 20 mL  buprenorphine (BUPRENEX) injection, 0.15 mg  dexamethasone sodium phosphate injection, 2 mg  Med admintered at 5/14/2020 7:16 AM      Post Assessment  Injection Assessment: negative aspiration for heme, no paresthesia on injection and incremental injection  Patient Tolerance:comfortable throughout block  Complications:no  Additional Notes  Procedure:                                                         The pt was placed in  lateral position.  The Insertion site was prepped. The pt was anesthetized with  IV Sedation( see meds).  Skin and cutaneous tissue was infiltrated and anesthetized with 1% Lidocaine 3 mls via a 25g needle.  A BBraun 4 inch 20g echogenic needle was then  inserted approximately 3 cm proximal to the popliteal harman a at the lateral mid biceps femoris and advanced In-plane with Ultrasound guidance.  Normal Saline PSF was utilized for hydrodissection  of tissue.  The popliteal artery was visualized and the common peroneal and tibial bifurcation was located.  LA injection spread was visualized, injection was incremental 1-5ml, injection pressure was normal or little, no intraneural injection, no vascular injection.

## 2020-05-14 NOTE — ANESTHESIA PROCEDURE NOTES
Left Popliteal Catheter      Patient reassessed immediately prior to procedure    Patient location during procedure: post-op  Reason for block: at surgeon's request and post-op pain management  Performed by  CRNA: Katalina Richter CRNA  Assisted by: Haley Chapa RN  Preanesthetic Checklist  Completed: patient identified, site marked, surgical consent, pre-op evaluation, timeout performed, IV checked, risks and benefits discussed and monitors and equipment checked  Prep:  Pt Position: supine  Sterile barriers:cap, gloves, mask and sterile barriers  Prep: ChloraPrep  Patient monitoring: blood pressure monitoring, continuous pulse oximetry and EKG  Procedure  Performed under: local infiltration  Guidance:ultrasound guided  Images:still images not obtained    Laterality:left  Block Type:popliteal  Injection Technique:catheter  Needle Type:echogenic  Needle Gauge:18 G  Resistance on Injection: none  Catheter Size:20 G  Cath Depth at skin: 7 cm    Medications Used: bupivacaine PF (MARCAINE) 0.25 % injection, 10 mL  Med admintered at 5/14/2020 10:15 AM      Post Assessment  Injection Assessment: negative aspiration for heme, no paresthesia on injection and incremental injection  Patient Tolerance:comfortable throughout block  Complications:no  Additional Notes  Procedure:                                                         The pt was placed in  lateral position.  The Insertion site was  prepped and Draped in sterile fashion.  The pt was anesthetized with  IV Sedation( see meds).  Skin and cutaneous tissue was infiltrated and anesthetized with 1% Lidocaine 3 mls via a 25g needle.  A BBraun 4 inch 18g echogenic needle was then  inserted approximately 3 cm proximal to the popliteal harman a at the lateral mid biceps femoris and advanced In-plane with Ultrasound guidance.  Normal Saline PSF was utilized for hydrodissection of tissue.  The popliteal artery was visualized and the common peroneal and tibial bifurcation  was located.  LA injection spread was visualized, injection was incremental 1-5ml, injection pressure was normal or little, no intraneural injection, no vascular injection.  .  A BBraun 20g wire stylet catheter was placed via the needle with ultrasound visualization and confirmation with NS fluid bolus. The labeled Catheter was then secured at insertions site with skin afix,  mastisol, steristrips  and a CHG transparent dressing was placed over. Thank you

## 2020-05-14 NOTE — ANESTHESIA POSTPROCEDURE EVALUATION
Patient: Toya Apple    Procedure Summary     Date:  05/14/20 Room / Location:   [facility]    Anesthesia Start:  0727 Anesthesia Stop:      Procedure:  OPEN REDUCTION INTERNAL FIXATION LEFT LATERAL TIBIAL PLATEAU FRACTURE (Left Leg Lower) Diagnosis:       Tibial plateau fracture, left, closed, initial encounter      (left knee lateral tibial plateau fracture)    Surgeon:  Gerard Zheng MD Provider:  Manfred Simeon MD    Anesthesia Type:  general with block ASA Status:  1          Anesthesia Type: general with block    Vitals  Vitals Value Taken Time   /71 5/14/2020 10:15 AM   Temp 98.6 °F (37 °C) 5/14/2020  9:37 AM   Pulse 87 5/14/2020 10:21 AM   Resp 16 5/14/2020  9:50 AM   SpO2 98 % 5/14/2020 10:21 AM   Vitals shown include unvalidated device data.        Post Anesthesia Care and Evaluation    Patient location during evaluation: PACU  Patient participation: complete - patient participated  Level of consciousness: awake and responsive to verbal stimuli  Pain score: 0  Pain management: adequate  Airway patency: patent  Anesthetic complications: No anesthetic complications  PONV Status: none  Cardiovascular status: stable  Respiratory status: acceptable, nasal cannula, oral airway and spontaneous ventilation  Hydration status: stable    Comments: Pt transferred to PACU with O2. Vital signs stable. Report to PACU RN and care accepted.

## 2020-07-22 ENCOUNTER — TRANSCRIBE ORDERS (OUTPATIENT)
Dept: ADMINISTRATIVE | Facility: HOSPITAL | Age: 55
End: 2020-07-22

## 2020-07-22 DIAGNOSIS — Z12.31 VISIT FOR SCREENING MAMMOGRAM: Primary | ICD-10-CM

## 2020-10-01 DIAGNOSIS — Z12.11 SCREENING FOR COLON CANCER: Primary | ICD-10-CM

## 2020-10-04 ENCOUNTER — APPOINTMENT (OUTPATIENT)
Dept: PREADMISSION TESTING | Facility: HOSPITAL | Age: 55
End: 2020-10-04

## 2020-10-05 DIAGNOSIS — Z12.11 SCREENING FOR COLON CANCER: Primary | ICD-10-CM

## 2020-10-08 ENCOUNTER — TELEPHONE (OUTPATIENT)
Dept: GASTROENTEROLOGY | Facility: CLINIC | Age: 55
End: 2020-10-08

## 2020-10-08 NOTE — TELEPHONE ENCOUNTER
I called Ms Apple back. Informed her that her Clenpiq prescription has been sent to [facility] in [facility]. Patient stated that she will go by there today.

## 2020-10-11 ENCOUNTER — APPOINTMENT (OUTPATIENT)
Dept: PREADMISSION TESTING | Facility: HOSPITAL | Age: 55
End: 2020-10-11

## 2020-10-11 PROCEDURE — C9803 HOPD COVID-19 SPEC COLLECT: HCPCS

## 2020-10-11 PROCEDURE — U0004 COV-19 TEST NON-CDC HGH THRU: HCPCS

## 2020-10-12 LAB
REF LAB TEST METHOD: NORMAL
SARS-COV-2 RNA RESP QL NAA+PROBE: NOT DETECTED

## 2020-10-13 ENCOUNTER — OUTSIDE FACILITY SERVICE (OUTPATIENT)
Dept: GASTROENTEROLOGY | Facility: CLINIC | Age: 55
End: 2020-10-13

## 2020-10-13 PROCEDURE — G0500 MOD SEDAT ENDO SERVICE >5YRS: HCPCS | Performed by: INTERNAL MEDICINE

## 2020-10-13 PROCEDURE — 88305 TISSUE EXAM BY PATHOLOGIST: CPT | Performed by: INTERNAL MEDICINE

## 2020-10-13 PROCEDURE — 45385 COLONOSCOPY W/LESION REMOVAL: CPT | Performed by: INTERNAL MEDICINE

## 2020-10-14 ENCOUNTER — LAB REQUISITION (OUTPATIENT)
Dept: LAB | Facility: HOSPITAL | Age: 55
End: 2020-10-14

## 2020-10-14 DIAGNOSIS — Z86.010 PERSONAL HISTORY OF COLONIC POLYPS: ICD-10-CM

## 2020-10-14 DIAGNOSIS — Z12.11 ENCOUNTER FOR SCREENING FOR MALIGNANT NEOPLASM OF COLON: ICD-10-CM

## 2020-10-15 ENCOUNTER — HOSPITAL ENCOUNTER (OUTPATIENT)
Dept: MAMMOGRAPHY | Facility: HOSPITAL | Age: 55
Discharge: HOME | End: 2020-10-15
Admitting: OBSTETRICS & GYNECOLOGY

## 2020-10-15 DIAGNOSIS — Z12.31 VISIT FOR SCREENING MAMMOGRAM: ICD-10-CM

## 2020-10-15 LAB
CYTO UR: NORMAL
LAB AP CASE REPORT: NORMAL
LAB AP CLINICAL INFORMATION: NORMAL
LAB AP DIAGNOSIS COMMENT: NORMAL
PATH REPORT.FINAL DX SPEC: NORMAL
PATH REPORT.GROSS SPEC: NORMAL

## 2020-10-15 PROCEDURE — 77063 BREAST TOMOSYNTHESIS BI: CPT

## 2020-10-15 PROCEDURE — 77067 SCR MAMMO BI INCL CAD: CPT

## 2020-10-15 PROCEDURE — 77063 BREAST TOMOSYNTHESIS BI: CPT | Performed by: RADIOLOGY

## 2020-10-15 PROCEDURE — 77067 SCR MAMMO BI INCL CAD: CPT | Performed by: RADIOLOGY

## 2020-10-22 ENCOUNTER — HOSPITAL ENCOUNTER (OUTPATIENT)
Dept: ULTRASOUND IMAGING | Facility: HOSPITAL | Age: 55
Discharge: HOME | End: 2020-10-22
Admitting: RADIOLOGY

## 2020-10-22 DIAGNOSIS — R92.8 ABNORMAL MAMMOGRAM: ICD-10-CM

## 2020-10-22 PROCEDURE — 76642 ULTRASOUND BREAST LIMITED: CPT | Performed by: RADIOLOGY

## 2020-10-22 PROCEDURE — 76642 ULTRASOUND BREAST LIMITED: CPT

## 2021-02-19 ENCOUNTER — HOSPITAL ENCOUNTER (OUTPATIENT)
Age: 56
End: 2021-02-19
Payer: COMMERCIAL

## 2021-02-19 DIAGNOSIS — Z20.822: ICD-10-CM

## 2021-02-19 DIAGNOSIS — Z01.812: Primary | ICD-10-CM

## 2021-02-19 DIAGNOSIS — Z13.810: ICD-10-CM

## 2021-02-19 PROCEDURE — U0003 INFECTIOUS AGENT DETECTION BY NUCLEIC ACID (DNA OR RNA); SEVERE ACUTE RESPIRATORY SYNDROME CORONAVIRUS 2 (SARS-COV-2) (CORONAVIRUS DISEASE [COVID-19]), AMPLIFIED PROBE TECHNIQUE, MAKING USE OF HIGH THROUGHPUT TECHNOLOGIES AS DESCRIBED BY CMS-2020-01-R: HCPCS

## 2021-02-22 ENCOUNTER — ON CAMPUS - OUTPATIENT (OUTPATIENT)
Dept: RURAL HOSPITAL 6 | Facility: HOSPITAL | Age: 56
End: 2021-02-22

## 2021-02-22 ENCOUNTER — HOSPITAL ENCOUNTER (OUTPATIENT)
Dept: HOSPITAL 22 - OUTP | Age: 56
Discharge: HOME | End: 2021-02-22
Payer: COMMERCIAL

## 2021-02-22 VITALS
RESPIRATION RATE: 18 BRPM | DIASTOLIC BLOOD PRESSURE: 79 MMHG | TEMPERATURE: 98.06 F | HEART RATE: 56 BPM | SYSTOLIC BLOOD PRESSURE: 132 MMHG

## 2021-02-22 VITALS
HEART RATE: 47 BPM | RESPIRATION RATE: 18 BRPM | SYSTOLIC BLOOD PRESSURE: 140 MMHG | OXYGEN SATURATION: 99 % | DIASTOLIC BLOOD PRESSURE: 69 MMHG | TEMPERATURE: 97.88 F

## 2021-02-22 VITALS
HEART RATE: 48 BPM | DIASTOLIC BLOOD PRESSURE: 72 MMHG | RESPIRATION RATE: 18 BRPM | TEMPERATURE: 97.88 F | OXYGEN SATURATION: 99 % | SYSTOLIC BLOOD PRESSURE: 125 MMHG

## 2021-02-22 VITALS
DIASTOLIC BLOOD PRESSURE: 61 MMHG | RESPIRATION RATE: 18 BRPM | HEART RATE: 73 BPM | TEMPERATURE: 97.88 F | SYSTOLIC BLOOD PRESSURE: 100 MMHG | OXYGEN SATURATION: 99 %

## 2021-02-22 VITALS
HEART RATE: 52 BPM | OXYGEN SATURATION: 100 % | RESPIRATION RATE: 18 BRPM | SYSTOLIC BLOOD PRESSURE: 122 MMHG | DIASTOLIC BLOOD PRESSURE: 71 MMHG | TEMPERATURE: 97.88 F

## 2021-02-22 VITALS — BODY MASS INDEX: 18.5 KG/M2

## 2021-02-22 VITALS
OXYGEN SATURATION: 100 % | DIASTOLIC BLOOD PRESSURE: 71 MMHG | HEART RATE: 52 BPM | SYSTOLIC BLOOD PRESSURE: 112 MMHG | RESPIRATION RATE: 18 BRPM | TEMPERATURE: 97.8 F

## 2021-02-22 VITALS — OXYGEN SATURATION: 97 %

## 2021-02-22 DIAGNOSIS — K22.4: ICD-10-CM

## 2021-02-22 DIAGNOSIS — R07.89 OTHER CHEST PAIN: ICD-10-CM

## 2021-02-22 DIAGNOSIS — K44.9: ICD-10-CM

## 2021-02-22 DIAGNOSIS — K21.9: Primary | ICD-10-CM

## 2021-02-22 DIAGNOSIS — K29.50 UNSPECIFIED CHRONIC GASTRITIS WITHOUT BLEEDING: ICD-10-CM

## 2021-02-22 DIAGNOSIS — K22.70 BARRETT'S ESOPHAGUS WITHOUT DYSPLASIA: ICD-10-CM

## 2021-02-22 DIAGNOSIS — R10.12 LEFT UPPER QUADRANT PAIN: ICD-10-CM

## 2021-02-22 DIAGNOSIS — Z79.899: ICD-10-CM

## 2021-02-22 DIAGNOSIS — Z85.828: ICD-10-CM

## 2021-02-22 DIAGNOSIS — R10.13 EPIGASTRIC PAIN: ICD-10-CM

## 2021-02-22 DIAGNOSIS — R13.10 DYSPHAGIA, UNSPECIFIED: ICD-10-CM

## 2021-02-22 DIAGNOSIS — K31.9: ICD-10-CM

## 2021-02-22 DIAGNOSIS — K22.4 DYSKINESIA OF ESOPHAGUS: ICD-10-CM

## 2021-02-22 PROCEDURE — 0DJ08ZZ INSPECTION OF UPPER INTESTINAL TRACT, VIA NATURAL OR ARTIFICIAL OPENING ENDOSCOPIC: ICD-10-PCS | Performed by: INTERNAL MEDICINE

## 2021-02-22 PROCEDURE — 43239 EGD BIOPSY SINGLE/MULTIPLE: CPT | Mod: 59 | Performed by: INTERNAL MEDICINE

## 2021-02-22 PROCEDURE — 43239 EGD BIOPSY SINGLE/MULTIPLE: CPT

## 2021-02-22 PROCEDURE — C1726 CATH, BAL DIL, NON-VASCULAR: HCPCS

## 2021-02-22 PROCEDURE — 43249 ESOPH EGD DILATION <30 MM: CPT | Performed by: INTERNAL MEDICINE

## 2021-02-22 PROCEDURE — 43249 ESOPH EGD DILATION <30 MM: CPT

## 2021-05-17 ENCOUNTER — OFFICE (OUTPATIENT)
Dept: RURAL CLINIC 2 | Facility: CLINIC | Age: 56
End: 2021-05-17

## 2021-05-17 VITALS
SYSTOLIC BLOOD PRESSURE: 118 MMHG | TEMPERATURE: 96.9 F | DIASTOLIC BLOOD PRESSURE: 72 MMHG | WEIGHT: 110 LBS | HEIGHT: 64 IN

## 2021-05-17 DIAGNOSIS — K30 FUNCTIONAL DYSPEPSIA: ICD-10-CM

## 2021-05-17 DIAGNOSIS — R19.4 CHANGE IN BOWEL HABIT: ICD-10-CM

## 2021-05-17 PROCEDURE — 99214 OFFICE O/P EST MOD 30 MIN: CPT | Performed by: NURSE PRACTITIONER

## 2021-06-30 ENCOUNTER — TRANSCRIBE ORDERS (OUTPATIENT)
Dept: NUTRITION | Facility: HOSPITAL | Age: 56
End: 2021-06-30

## 2021-06-30 DIAGNOSIS — E74.31 SUCRASE-ISOMALTASE DEFICIENCY: Primary | ICD-10-CM

## 2021-07-07 ENCOUNTER — TRANSCRIBE ORDERS (OUTPATIENT)
Dept: ADMINISTRATIVE | Facility: HOSPITAL | Age: 56
End: 2021-07-07

## 2021-07-07 DIAGNOSIS — R10.12 ABDOMINAL PAIN, LUQ: Primary | ICD-10-CM

## 2021-07-14 ENCOUNTER — HOSPITAL ENCOUNTER (OUTPATIENT)
Dept: ULTRASOUND IMAGING | Facility: HOSPITAL | Age: 56
Discharge: HOME | End: 2021-07-14
Admitting: INTERNAL MEDICINE

## 2021-07-14 DIAGNOSIS — R10.12 ABDOMINAL PAIN, LUQ: ICD-10-CM

## 2021-07-14 PROCEDURE — 76705 ECHO EXAM OF ABDOMEN: CPT

## 2021-07-15 ENCOUNTER — OFFICE (OUTPATIENT)
Dept: URBAN - METROPOLITAN AREA CLINIC 4 | Facility: CLINIC | Age: 56
End: 2021-07-15
Payer: COMMERCIAL

## 2021-07-15 VITALS
TEMPERATURE: 98.4 F | DIASTOLIC BLOOD PRESSURE: 74 MMHG | HEIGHT: 64 IN | WEIGHT: 104 LBS | SYSTOLIC BLOOD PRESSURE: 112 MMHG

## 2021-07-15 DIAGNOSIS — R93.3 ABNORMAL FINDINGS ON DIAGNOSTIC IMAGING OF OTHER PARTS OF DI: ICD-10-CM

## 2021-07-15 DIAGNOSIS — R14.2 ERUCTATION: ICD-10-CM

## 2021-07-15 DIAGNOSIS — R10.12 LEFT UPPER QUADRANT PAIN: ICD-10-CM

## 2021-07-15 DIAGNOSIS — K30 FUNCTIONAL DYSPEPSIA: ICD-10-CM

## 2021-07-15 PROCEDURE — 99214 OFFICE O/P EST MOD 30 MIN: CPT | Performed by: NURSE PRACTITIONER

## 2021-07-15 RX ORDER — DICYCLOMINE HYDROCHLORIDE 10 MG/1
40 CAPSULE ORAL
Qty: 60 | Refills: 5 | Status: ACTIVE
Start: 2021-07-15

## 2021-07-26 ENCOUNTER — HOSPITAL ENCOUNTER (OUTPATIENT)
Dept: NUTRITION | Facility: HOSPITAL | Age: 56
Setting detail: RECURRING SERIES
Discharge: HOME | End: 2021-07-26

## 2021-07-26 VITALS — HEIGHT: 64 IN | WEIGHT: 103 LBS | BODY MASS INDEX: 17.58 KG/M2

## 2021-07-26 PROCEDURE — 97802 MEDICAL NUTRITION INDIV IN: CPT | Performed by: DIETITIAN, REGISTERED

## 2021-07-26 NOTE — CONSULTS
"Adult Outpatient Nutrition  Assessment/PES    Patient Name:  Toya Apple  YOB: 1965  MRN: [id number]    Assessment Date:  7/26/2021    Comments:  Telephone nutrition consult, 60 minutes. This medical referred consult was provided as a telephone call, tele-health or e-visit, as patient is unable to attend an in-office appointment due to the COVID-19 crisis. Consent for treatment was given verbally.    Patient describes problems with bloating/belching and diarrhea. She states that she has always had a \"weird\" stomach but symptoms were recently exacerbated by COVID-19 infection. She states that she has since cleared the infection, but GI symptoms have not resolved. 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 She has been following low sucrose diet x 1 month with minimal improvement in symptoms. She does report significant fatigue as well as unintended weight loss as a result of low sucrose diet. She has been unable to obtain Sucraid d/t no prescription insurance coverage and income higher than required maximum.     During the session we discussed the CISD diagnosis, as well as the  low-sucrose diet as a means of identifying tolerated vs non-tolerated foods as well as tolerated dose. This is typically done in two stages, an elimination state (during which all starches and sugars are eliminated from the diet) and a challenge phase (duing which individual foods are reintroduced at increasing doses to determine tolerance). Informed that goal is to balance symptom avoidance with QOL, which typically results in a combination of dietary modification as well as medication management. Because patient has already completed 4 weeks of elimination, we discussed pursuing challenge phase vs. Pursuing re-evaluation by GI. While it is not within my scope to diagnose GI disorders, it does make logical sense that either SIBO or IBS could be exacerbating her " "symptoms. We did discuss the possibility of SIBO vs IBS and potential for low FODMAP therapy if low sucrose proves to be unsuccessful. Encouraged patient to reach out to GI. In the meantime, we will proceed with low sucrose challenge phase. RD advised patient to begin reintroducing previously excluded foods into diet, one at a time, starting with one new food every 3 days. RD advised to being with small amounts of new foods and slowly increase to determine tolerated dose. Advised to only proceed with testing new foods should challenged foods/dose is tolerated. Patient does hope to obtain Sucraid in the fall when her insurance changes.     Goals:  - Low sucrose challenge phase  - maintain vs gain weight     Total of 60 minutes spent with patient on nutrition counseling. Education based on Academy of Dietetics and Nutrition guidelines. Patient was provided with RD's contact information. Follow up visit is scheduled for 8/27/21 at 11:15 am. Thank you for this referral.      General Info     Row Name 07/26/21 4667       Today's Session    Person(s) attending today's session  Patient     Services Used Today?  No       General Information    How Well Do You Speak English?  very well    Do You Speak a Language Other Than English at Home?  no    Are you able to read and write English?  Yes    Lives With  alone    Last grade of school completed  bachelors    Is patient pregnant?  no          Anthropometrics     Row Name 07/26/21 1352          Anthropometrics    Height  162.6 cm (64\")     Weight  46.7 kg (103 lb)        Ideal Body Weight (IBW)    Ideal Body Weight (IBW) (kg)  55     % Ideal Body Weight  84.94        Usual Body Weight (UBW)    Weight Loss Time Frame  1 mo        Body Mass Index (BMI)    BMI (kg/m2)  17.72         Nutritional Info/Activity     Row Name 07/26/21 1350       Nutritional Information    Have you had weight changes?  Yes    Describe weight changes  5 lb unintended weight loss x 1 mo    " What is your desired body weight?  49.9 kg (110 lb)    Have you tried to lose weight before?  No    What is your motivation to lose weight?  none, wants to gain    Food Allergies  other (see comments) CSID    Supplemental Drinks/Foods/Additives  beet root juice, calcium, Iberogast, fibercon, miralax    History of eating disorder?  No    What cultural diet influences are important for you to follow?  none listed    Do you have difficulty chewing food?  No    Functional Status  able to prepare meals;able to purchase food;ambulatory    List any food cravings/trigger foods you have  none listed    List any food aversions  none listed    How often during the day do you find yourself snacking?  2-3    Do you use Food Assistance programs (WIC, food stamps, food bank)?  no    Do you need information about Food Assistance programs?  no    How many meals do you eat each day?  3    How many snacks do you eat each day?  2    What is the biggest challenge you have with your diet?  Food causing negative symptoms;Weight maintenance    What type of support do you currently use to help you with your health issues?  none    Enter everything you can remember eating in the last 24 hours (1 day)  Breakfast: 2 eggs withspinach, mushrooms and heavy creame, blueberries Snack: PB powder and lactose free milk Lunch: black forest ham, cottage cheese, cheddar cheese, blueberries Dinner: Harlan, broccoli Snack: PB granola       Eating Environment    Eating environment  Family;Work       Physical Activity    Are you currently involved in an activity/exercise program?   Yes    Describe physical activity  cycling- professional    How many minutes do you spend on exercise each day?  60    How would you rank exercise as an important health lifestyle practice?  10        Home Nutrition Report     Row Name 07/26/21 2792          Home Nutrition Report    Diet  No specific     Supplemental Drinks/Foods/Additives  beet root juice, calcium, Iberogast,  "fibercon, miralax         Estimated/Assessed Needs     Row Name 07/26/21 1352          Calculation Measurements    Weight Used For Calculations  46.7 kg (103 lb)     Height  162.6 cm (64\")        Estimated/Assessed Needs    Additional Documentation  [place]-St. Jeor Equation (Group)        [place]-St. Jeor Equation    RMR ([place]-St. Jeor Equation)  1037.2     [place]-St. Jeor Activity Factors  1.4 - 1.5     Activity Factors ([place]-St. Jeor)  1452.08 - 1555.8           Labs/Tests/Procedures/Meds     Row Name 07/26/21 1430          Labs/Procedures/Meds    Lab Results Reviewed  reviewed        Diagnostic Tests/Procedures    Diagnostic Test/Procedure Reviewed  reviewed        Medications    Pertinent Medications Reviewed  reviewed             Problem/Interventions:  Problem 1     Row Name 07/26/21 1430          Nutrition Diagnoses Problem 1    Problem 1  Altered GI Function     Etiology (related to)  Medical Diagnosis     Gastrointestinal  Other (comment)     Signs/Symptoms (evidenced by)  Biochemical;Report/Observation     Reported GI Symptoms  GI distress;Diarrhea     Specific Labs Noted  Other (comment) sucrose breath test= 2.76% sucrose digested, NL > 5.1%                           Electronically signed by:  Tamy Pardo RD  07/26/21 15:01 EDT  "

## 2021-08-09 ENCOUNTER — TRANSCRIBE ORDERS (OUTPATIENT)
Dept: ADMINISTRATIVE | Facility: HOSPITAL | Age: 56
End: 2021-08-09

## 2021-08-09 DIAGNOSIS — R93.5 ABNORMAL ULTRASOUND OF ABDOMEN: Primary | ICD-10-CM

## 2021-08-26 ENCOUNTER — OFFICE (OUTPATIENT)
Dept: URBAN - METROPOLITAN AREA CLINIC 4 | Facility: CLINIC | Age: 56
End: 2021-08-26
Payer: COMMERCIAL

## 2021-08-26 ENCOUNTER — DOCUMENTATION (OUTPATIENT)
Dept: NUTRITION | Facility: HOSPITAL | Age: 56
End: 2021-08-26

## 2021-08-26 VITALS — WEIGHT: 104 LBS | HEIGHT: 64 IN

## 2021-08-26 DIAGNOSIS — R14.2 ERUCTATION: ICD-10-CM

## 2021-08-26 DIAGNOSIS — K21.0 GASTRO-ESOPHAGEAL REFLUX DISEASE WITH ESOPHAGITIS: ICD-10-CM

## 2021-08-26 DIAGNOSIS — K30 FUNCTIONAL DYSPEPSIA: ICD-10-CM

## 2021-08-26 DIAGNOSIS — J02.9 ACUTE PHARYNGITIS, UNSPECIFIED: ICD-10-CM

## 2021-08-26 PROCEDURE — 99214 OFFICE O/P EST MOD 30 MIN: CPT | Mod: 95 | Performed by: NURSE PRACTITIONER

## 2021-08-26 NOTE — PROGRESS NOTES
Pediatric Nutrition  Assessment/PES    Patient Name:  Toya Apple  YOB: 1965  MRN: [id number]  Admit Date:  (Not on file)    Assessment Date:  8/26/2021    Comments:  Patient emailed to inform that she will be trialing samples of Xifaxan staring on Monday. She would like to RS appt scheduled for 8/31 to allow for her to complete 14 day trial prior to f/up. RD in agreement. Appointment RS for 9/22/21 at 2:30 pm.     Electronically signed by:  Tamy Pardo RD  08/26/21 15:56 EDT

## 2021-08-27 ENCOUNTER — APPOINTMENT (OUTPATIENT)
Dept: NUTRITION | Facility: HOSPITAL | Age: 56
End: 2021-08-27

## 2021-08-31 ENCOUNTER — APPOINTMENT (OUTPATIENT)
Dept: NUTRITION | Facility: HOSPITAL | Age: 56
End: 2021-08-31

## 2021-09-22 ENCOUNTER — HOSPITAL ENCOUNTER (OUTPATIENT)
Dept: NUTRITION | Facility: HOSPITAL | Age: 56
Setting detail: RECURRING SERIES
Discharge: HOME | End: 2021-09-22

## 2021-09-22 NOTE — PROGRESS NOTES
Adult Outpatient Nutrition  Assessment/PES    Patient Name:  Toya Apple  YOB: 1965  MRN: [id number]    Assessment Date:  9/22/2021    Comments:  Telephone nutrition consult, 30 minutes. This medical referred consult was provided as a telephone call, as patient is unable to attend an in-office appointment due to the COVID-19 crisis. Consent for treatment was given verbally.    Patient presents for follow up for bloating, diarrhea and RUQ abdominal pain. She was diagnosed with CSID via sucrose breath test which revealed sucrose diegestion of 2.76% (NL>5.1%). Low sucrose elimination diet was not effective. She was previously unable to obtain Sucraid d/t insurance coverage, however she does report change in insuranace in the next month and will try again. She did take course of xifaxan which she feels helped some, but she is still having 2-3 loose bowel movements per day (which is reduced from 5-6 prior to xifaxan). Her PCP also recommended colestid which she has not yet started. We did discuss trial of low FODMAP diet vs re-evaluation with GI. Patient states that she has GI appt scheduled in December and would like to try low FODMAP diet in the meantime. Patient previously received low FODMAP Elimination and Challenge packets from [facility]. We reviewed the elimination phase in detail and patient demonstrated good understanding.     Goal Progresion:  - Low sucrose challenge phase, 100% met  - maintain vs gain weight,100%     New Goals:  - low FODMAP elimination  - maintain weight      Total of 30 minutes spent with patient on nutrition counseling. Education based on Academy of Dietetics and Nutrition guidelines. Patient was provided with RD's contact information. Follow up visit is scheduled for 10/28/21 at 4:00 pm. Thank you for this referral.    Electronically signed by:  Tamy Pardo RD  09/22/21 14:50 EDT

## 2021-10-28 ENCOUNTER — HOSPITAL ENCOUNTER (OUTPATIENT)
Dept: NUTRITION | Facility: HOSPITAL | Age: 56
Setting detail: RECURRING SERIES
Discharge: HOME | End: 2021-10-28

## 2021-10-28 PROCEDURE — 97803 MED NUTRITION INDIV SUBSEQ: CPT | Performed by: DIETITIAN, REGISTERED

## 2021-10-28 NOTE — PROGRESS NOTES
Adult Outpatient Nutrition  Assessment/PES    Patient Name:  Toya Apple  YOB: 1965  MRN: [id number]    Assessment Date:  10/28/2021    Comments:  Telephone nutrition consult, 30 minutes. This medical referred consult was provided as a phone call, as patient is unable to attend an in-office appointment due to the COVID-19 crisis. Consent for treatment was given verbally.    Patient presents for continued follow up for bloating, diarrhea, and abdominal pain. She has been on low FODMAP elimination diet x 3 weeks and has not noticed much improvement. She has seen PCP since last visit and has changed GI regimen to include lantoprazole in the morning and pepcid in the evening. She is also taking dicyclomine TID and has not noticed any change. At this time, there is little else to discuss from a dietary standpoint without further clinical data. Patient will follow up with GI in December and discuss further testing as well as medication options. Patient states that he has no further questions or concerns at this time. RD provided contact information and instructed to call should further nutrition concerns arise.              Goal Progression:  - low FODMAP elimination, 100% met  - maintain weight      Total of 30 minutes spent with patient on nutrition counseling. Education based on Academy of Dietetics and Nutrition guidelines. Patient was provided with RD's contact information. Follow up PRN. Thank you for this referral    Electronically signed by:  Tamy Pardo RD  10/28/21 16:03 EDT

## 2021-10-29 ENCOUNTER — TRANSCRIBE ORDERS (OUTPATIENT)
Dept: ADMINISTRATIVE | Facility: HOSPITAL | Age: 56
End: 2021-10-29

## 2021-10-29 DIAGNOSIS — Z12.31 VISIT FOR SCREENING MAMMOGRAM: Primary | ICD-10-CM

## 2021-11-11 ENCOUNTER — TELEPHONE (OUTPATIENT)
Dept: GASTROENTEROLOGY | Facility: CLINIC | Age: 56
End: 2021-11-11

## 2021-11-11 NOTE — TELEPHONE ENCOUNTER
[facility]RS TORRES CALLED TO SCHEDULE A APPT. WE DON'T HAVE REFERRAL FROM DR. HUDSON AT THIS TIME. PATIENT GOING TO CALL HIS OFFICE TO ASK THEM TO SEND REFERRAL.

## 2021-11-11 NOTE — TELEPHONE ENCOUNTER
Mrs. Apple has called in to the office today, stating that her referral from Dr. Tillman was to Dr. Crystal and she wants to be seen by him due to all the great things she has heard from others.     Her last colonoscopy was scheduled with Dr. Shelley- I explained we do not share patients and that I would have to ask permission to switch her doctor,  I would make a note of her request and call her back with the determination.

## 2021-11-12 ENCOUNTER — TELEPHONE (OUTPATIENT)
Dept: GASTROENTEROLOGY | Facility: CLINIC | Age: 56
End: 2021-11-12

## 2021-11-12 NOTE — TELEPHONE ENCOUNTER
PATIENT WAS RECOMMEND BY DR. SCOTTY HUDSON TO COME SEE YOU FOR ONGOING ISSUES THAT HAVE BEEN UNRESOLVED. SHE PREVIOUSLY HAS SEEN DR. SKINNER FOR 2 SCREENING COLONOSCOPIES BUT NEVER IN THE OFFICE. SHE WANTS TO SEE YOU FOR FOLLOW UP BASED ON HER DR. DUMONT RECOMMENDATION.

## 2021-12-02 ENCOUNTER — OFFICE VISIT (OUTPATIENT)
Dept: OBSTETRICS AND GYNECOLOGY | Facility: CLINIC | Age: 56
End: 2021-12-02

## 2021-12-02 VITALS
HEIGHT: 64 IN | SYSTOLIC BLOOD PRESSURE: 110 MMHG | DIASTOLIC BLOOD PRESSURE: 60 MMHG | WEIGHT: 108 LBS | BODY MASS INDEX: 18.44 KG/M2

## 2021-12-02 DIAGNOSIS — Z01.419 PAP TEST, AS PART OF ROUTINE GYNECOLOGICAL EXAMINATION: Primary | ICD-10-CM

## 2021-12-02 DIAGNOSIS — M85.80 OSTEOPENIA, SENILE: ICD-10-CM

## 2021-12-02 DIAGNOSIS — Z12.31 ENCOUNTER FOR SCREENING MAMMOGRAM FOR MALIGNANT NEOPLASM OF BREAST: ICD-10-CM

## 2021-12-02 PROCEDURE — 99396 PREV VISIT EST AGE 40-64: CPT | Performed by: OBSTETRICS & GYNECOLOGY

## 2021-12-02 RX ORDER — FAMOTIDINE 20 MG/1
TABLET, FILM COATED ORAL
COMMUNITY
Start: 2021-10-07 | End: 2022-03-01

## 2021-12-02 RX ORDER — CALCIUM/D3/MAG/K2/MIN/HERB 326 333-32 MG
1 TABLET ORAL DAILY
COMMUNITY
Start: 2020-11-30

## 2021-12-02 RX ORDER — AMITRIPTYLINE HYDROCHLORIDE 10 MG/1
10 TABLET, FILM COATED ORAL NIGHTLY
COMMUNITY
Start: 2021-11-10 | End: 2022-03-01

## 2021-12-02 RX ORDER — MONTELUKAST SODIUM 4 MG/1
1 TABLET, CHEWABLE ORAL 2 TIMES DAILY
COMMUNITY
Start: 2021-10-07

## 2021-12-02 RX ORDER — LANSOPRAZOLE 30 MG/1
30 CAPSULE, DELAYED RELEASE ORAL DAILY
COMMUNITY
Start: 2021-10-07 | End: 2023-03-02

## 2021-12-02 NOTE — PROGRESS NOTES
GYN Annual Exam     CC - Here for annual exam.        HPI  Toya Apple is a 56 y.o. female, , who presents for annual well woman exam.  She is postmenopausal.  Patient denies vaginal bleeding. ..  Patient reports problems with: none. Since her last visit the patient underwent surgery for broken knee, dx with osteopenia by pcp-Dr. Tillman. Partner Status: Marital Status: .  New Partners since last visit: no.      Additional OB/GYN History   Current contraception: contraceptive methods: Post menopausal status  Desires to: do not start contraception  On HRT? No  Last Pap :   Last Completed Pap Smear     This patient has no relevant Health Maintenance data.        History of abnormal Pap smear: no  Family history of uterine, colon, breast, or ovarian cancer: no  Performs monthly Self-Breast Exam: yes  Last mammogram: 10/2020-incomplete-patient scheduled for next week. Done at [facility].    Last Completed Mammogram     This patient has no relevant Health Maintenance data.        Last colonoscopy:   Last Completed Colonoscopy          COLORECTAL CANCER SCREENING (COLONOSCOPY - Every 10 Years) Next due on 10/13/2030    10/13/2020  SCANNED - COLONOSCOPY              Last DEXA: On unsure of date-with pcp Dr. Tillman and results were Osteopenia  Exercises Regularly: yes  Feelings of Anxiety or Depression: no      Tobacco Usage?: No   OB History        2    Para   2    Term   2            AB        Living           SAB        IAB        Ectopic        Molar        Multiple        Live Births                    Health Maintenance   Topic Date Due   • Annual Gynecologic Pelvic and Breast Exam  Never done   • DXA SCAN  Never done   • ANNUAL PHYSICAL  Never done   • TDAP/TD VACCINES (1 - Tdap) Never done   • ZOSTER VACCINE (1 of 2) Never done   • HEPATITIS C SCREENING  Never done   • PAP SMEAR  2019   • INFLUENZA VACCINE  2021   • MAMMOGRAM  2021   • COLORECTAL CANCER SCREENING  " 10/13/2030   • COVID-19 Vaccine  Completed   • Pneumococcal Vaccine 0-64  Aged Out       The additional following portions of the patient's history were reviewed and updated as appropriate: allergies, current medications, past family history, past medical history, past social history and past surgical history.    Review of Systems   Constitutional: Negative.    HENT: Negative.    Eyes: Negative.    Respiratory: Negative.    Cardiovascular: Negative.    Gastrointestinal: Negative.    Endocrine: Negative.    Genitourinary: Negative.    Musculoskeletal: Negative.    Skin: Negative.    Allergic/Immunologic: Negative.    Neurological: Negative.    Hematological: Negative.    Psychiatric/Behavioral: Negative.        I have reviewed and agree with the HPI, ROS, and historical information as entered above. Cody Rojo MD    Objective   /60   Ht 162.6 cm (64\")   Wt 49 kg (108 lb)   LMP  (LMP Unknown)   BMI 18.54 kg/m²     Physical Exam  Vitals and nursing note reviewed. Exam conducted with a chaperone present.   Constitutional:       Appearance: She is well-developed.   HENT:      Head: Normocephalic and atraumatic.   Neck:      Thyroid: No thyroid mass or thyromegaly.   Cardiovascular:      Rate and Rhythm: Normal rate and regular rhythm.      Heart sounds: No murmur heard.      Pulmonary:      Effort: Pulmonary effort is normal. No retractions.      Breath sounds: Normal breath sounds. No wheezing, rhonchi or rales.   Chest:      Chest wall: No mass or tenderness.   Breasts:      Right: Normal. No mass, nipple discharge, skin change or tenderness.      Left: Normal. No mass, nipple discharge, skin change or tenderness.       Abdominal:      General: Bowel sounds are normal.      Palpations: Abdomen is soft. Abdomen is not rigid. There is no mass.      Tenderness: There is no abdominal tenderness. There is no guarding.      Hernia: No hernia is present. There is no hernia in the left inguinal area. "   Genitourinary:     Labia:         Right: No rash, tenderness or lesion.         Left: No rash, tenderness or lesion.       Vagina: Normal. No vaginal discharge or lesions.      Cervix: No cervical motion tenderness, discharge, lesion or cervical bleeding.      Uterus: Normal. Not enlarged, not fixed and not tender.       Adnexa:         Right: No mass or tenderness.          Left: No mass or tenderness.        Rectum: No external hemorrhoid.   Musculoskeletal:      Cervical back: Normal range of motion. No muscular tenderness.   Neurological:      Mental Status: She is alert and oriented to person, place, and time.   Psychiatric:         Behavior: Behavior normal.            Assessment and Plan    Problem List Items Addressed This Visit     None      Visit Diagnoses     Pap test, as part of routine gynecological examination    -  Primary    Relevant Orders    Pap IG, Rfx HPV ASCU    Osteopenia, senile              1. GYN annual well woman exam.   2. Reviewed monthly self breast exams.  Instructed to call with lumps, pain, or breast discharge.  Yearly mammograms ordered.  3. Ordered mammogram today.  4. Recommended use of Vitamin D and getting adequate calcium in her diet. (1500mg)  5. Reviewed exercise as a preventative health measures.   6. Symptoms of menopausal transition reviewed with patient.   7. RTC in 1 year or PRN with problems.  8. Other: Had Fx L leg dog-- osteopenia dxd and rx Ca and monika D   9. No follow-ups on file.     Cody Rojo MD  12/02/2021

## 2021-12-03 DIAGNOSIS — Z20.822 ENCOUNTER FOR PREPROCEDURE SCREENING LABORATORY TESTING FOR COVID-19: Primary | ICD-10-CM

## 2021-12-03 DIAGNOSIS — Z01.812 ENCOUNTER FOR PREPROCEDURE SCREENING LABORATORY TESTING FOR COVID-19: Primary | ICD-10-CM

## 2021-12-07 ENCOUNTER — HOSPITAL ENCOUNTER (OUTPATIENT)
Dept: MAMMOGRAPHY | Facility: HOSPITAL | Age: 56
Discharge: HOME | End: 2021-12-07
Admitting: FAMILY MEDICINE

## 2021-12-07 DIAGNOSIS — Z12.31 VISIT FOR SCREENING MAMMOGRAM: ICD-10-CM

## 2021-12-07 PROCEDURE — 77063 BREAST TOMOSYNTHESIS BI: CPT | Performed by: RADIOLOGY

## 2021-12-07 PROCEDURE — 77063 BREAST TOMOSYNTHESIS BI: CPT

## 2021-12-07 PROCEDURE — 77067 SCR MAMMO BI INCL CAD: CPT

## 2021-12-07 PROCEDURE — 77067 SCR MAMMO BI INCL CAD: CPT | Performed by: RADIOLOGY

## 2021-12-09 DIAGNOSIS — Z01.419 PAP TEST, AS PART OF ROUTINE GYNECOLOGICAL EXAMINATION: ICD-10-CM

## 2021-12-19 ENCOUNTER — APPOINTMENT (OUTPATIENT)
Dept: PREADMISSION TESTING | Facility: HOSPITAL | Age: 56
End: 2021-12-19

## 2021-12-19 DIAGNOSIS — Z20.822 ENCOUNTER FOR PREPROCEDURE SCREENING LABORATORY TESTING FOR COVID-19: ICD-10-CM

## 2021-12-19 DIAGNOSIS — Z01.812 ENCOUNTER FOR PREPROCEDURE SCREENING LABORATORY TESTING FOR COVID-19: ICD-10-CM

## 2021-12-19 LAB — SARS-COV-2 RNA PNL SPEC NAA+PROBE: NOT DETECTED

## 2021-12-19 PROCEDURE — C9803 HOPD COVID-19 SPEC COLLECT: HCPCS

## 2021-12-19 PROCEDURE — U0004 COV-19 TEST NON-CDC HGH THRU: HCPCS

## 2021-12-21 ENCOUNTER — OUTSIDE FACILITY SERVICE (OUTPATIENT)
Dept: GASTROENTEROLOGY | Facility: CLINIC | Age: 56
End: 2021-12-21

## 2021-12-21 PROCEDURE — 43239 EGD BIOPSY SINGLE/MULTIPLE: CPT | Performed by: INTERNAL MEDICINE

## 2021-12-21 PROCEDURE — 88305 TISSUE EXAM BY PATHOLOGIST: CPT | Performed by: INTERNAL MEDICINE

## 2021-12-22 ENCOUNTER — LAB REQUISITION (OUTPATIENT)
Dept: LAB | Facility: HOSPITAL | Age: 56
End: 2021-12-22

## 2021-12-22 DIAGNOSIS — R10.12 LEFT UPPER QUADRANT PAIN: ICD-10-CM

## 2021-12-22 DIAGNOSIS — R10.13 EPIGASTRIC PAIN: ICD-10-CM

## 2021-12-22 DIAGNOSIS — K44.9 DIAPHRAGMATIC HERNIA WITHOUT OBSTRUCTION OR GANGRENE: ICD-10-CM

## 2021-12-23 LAB
CYTO UR: NORMAL
LAB AP CASE REPORT: NORMAL
LAB AP CLINICAL INFORMATION: NORMAL
PATH REPORT.FINAL DX SPEC: NORMAL
PATH REPORT.GROSS SPEC: NORMAL

## 2022-03-01 ENCOUNTER — OFFICE VISIT (OUTPATIENT)
Dept: GASTROENTEROLOGY | Facility: CLINIC | Age: 57
End: 2022-03-01

## 2022-03-01 VITALS
HEART RATE: 63 BPM | TEMPERATURE: 97.7 F | WEIGHT: 113.2 LBS | DIASTOLIC BLOOD PRESSURE: 75 MMHG | BODY MASS INDEX: 19.43 KG/M2 | SYSTOLIC BLOOD PRESSURE: 140 MMHG

## 2022-03-01 DIAGNOSIS — K58.1 IRRITABLE BOWEL SYNDROME WITH CONSTIPATION: ICD-10-CM

## 2022-03-01 DIAGNOSIS — K31.84 GASTROPARESIS: Primary | ICD-10-CM

## 2022-03-01 DIAGNOSIS — K21.9 GASTROESOPHAGEAL REFLUX DISEASE WITHOUT ESOPHAGITIS: ICD-10-CM

## 2022-03-01 PROCEDURE — 99214 OFFICE O/P EST MOD 30 MIN: CPT | Performed by: INTERNAL MEDICINE

## 2022-03-01 RX ORDER — METOCLOPRAMIDE HYDROCHLORIDE 10 MG/1
TABLET, ORALLY DISINTEGRATING ORAL
Qty: 90 TABLET | Refills: 2 | Status: SHIPPED | OUTPATIENT
Start: 2022-03-01 | End: 2022-03-02

## 2022-03-01 NOTE — PROGRESS NOTES
"GASTROENTEROLOGY OFFICE NOTE  Toya Apple  [id number]  1965      Chief Complaint   Patient presents with   • Follow-up     Post EGD   • Abdominal Pain   • Heartburn        HISTORY OF PRESENT ILLNESS:  Patient presents for follow-up of abdominal bloating and abdominal pain.    Patient states that her symptoms began in Dec 2020 when she was having a lot of stress and anxiety and was found to have atypical chest pain. She was treated for anxiety. She is also given a proton pump inhibitor. Her mother subsequently  and patient developed COVID-19 infection and developed a sore throat which has persisted. She was seen by ENT and upper endoscopy done in 2021 by Dr. Artie Lang was largely unremarkable felt to be suggestive of perhaps bile reflux. She subsequently started noticing a \"rib pain\" which seems to be worse postprandially, nonradiating, fairly severe at times but always postprandial. She was told to increase her dietary fiber as it was felt her symptoms may have been related constipation although she states she was having 2-3 bowel movements per day at that time. A sucrose breath test was apparently positive but dietary changes for this failed to alleviate her symptoms. She was then given a low FODMAP diet this also was ineffective in helping her symptoms. She was continue to have frequent bowel movements and Colestid was prescribed and this was helpful and alleviating her diarrhea but she continues to have left upper quadrant pain.    She states her left upper quadrant pain and bloating is her biggest complaint she can regurgitate food sometimes ingested up to 5 or 6 h before although not consistently so and denies any problems with visible distention. She denies dysphagia solids or odynophagia she denies true early satiety or unexplained weight loss and has noted no melena nor bright red blood per rectum.    Her most recent EGD was unremarkable and negative for celiac disease, H. pylori " gastritis or erosive esophagitis.    PAST MEDICAL HISTORY  Past Medical History:   Diagnosis Date   • Bile reflux esophagitis    • IBS (irritable bowel syndrome)         PAST SURGICAL HISTORY  Past Surgical History:   Procedure Laterality Date   • COLONOSCOPY     • ENDOSCOPY     • TIBIAL PLATEAU OPEN REDUCTION INTERNAL FIXATION Left 5/14/2020    Procedure: OPEN REDUCTION INTERNAL FIXATION LEFT LATERAL TIBIAL PLATEAU FRACTURE;  Surgeon: Gerard Zheng MD;  Location: [facility];  Service: Orthopedics;  Laterality: Left;   • WISDOM TOOTH EXTRACTION Bilateral         MEDICATIONS:    Current Outpatient Medications:   •  CALCIUM PO, Take 2 capsules by mouth 2 (Two) Times a Day., Disp: , Rfl:   •  colestipol (COLESTID) 1 g tablet, Take 1 g by mouth 2 (Two) Times a Day., Disp: , Rfl:   •  lansoprazole (PREVACID) 30 MG capsule, Take 30 mg by mouth Daily., Disp: , Rfl:   •  Multiple Vitamins-Minerals (Algae Based Calcium) tablet, Take 1 capsule by mouth Daily., Disp: , Rfl:   •  amitriptyline (ELAVIL) 10 MG tablet, Take 10 mg by mouth Every Night., Disp: , Rfl:   •  famotidine (Pepcid) 20 MG tablet, , Disp: , Rfl:     ALLERGIES  No Known Allergies    FAMILY HISTORY:  Family History   Adopted: Yes   Problem Relation Age of Onset   • No Known Problems Mother    • No Known Problems Father    • Breast cancer Neg Hx    • Ovarian cancer Neg Hx    • Endometrial cancer Neg Hx        SOCIAL HISTORY  Social History     Socioeconomic History   • Marital status:    Tobacco Use   • Smoking status: Never Smoker   • Smokeless tobacco: Never Used   Vaping Use   • Vaping Use: Never used   Substance and Sexual Activity   • Alcohol use: No   • Drug use: No   • Sexual activity: Yes         PHYSICAL EXAM   /75 (BP Location: Left arm, Patient Position: Sitting, Cuff Size: Adult)   Pulse 63   Temp 97.7 °F (36.5 °C) (Temporal)   Wt 51.3 kg (113 lb 3.2 oz)   LMP  (LMP Unknown)   BMI 19.43 kg/m²   General: Alert and oriented  x 3. In no apparent or acute distress.  and No stigmata of chronic liver disease  HEENT: Anicteric sclerae. Normal oropharynx  Neck: Supple. Without lymphadenopathy  CV: Regular rate and rhythm, S1, S2  Lungs: Clear to ausculation. Without rales, rhonchi and wheezing  Abdomen:  Soft,non-distended without palpable masses or hepatosplenomeagaly, areas of rebound tenderness or guarding.   Extremeties: without clubbing, cyanosis or edema  Neurologic:  Alert and oriented x 3 without focal motor or sensory deficits  Rectal exam: deferred        ASSESSMENT  1.-Suspected gastroparesis  2.-GERD  3.-Patient up-to-date on her colon cancer screening (Oct 2020 by Dr. James Shelley with no adenomatous polyps identified)    PLAN  1.-Trial of metoclopramide ODT 10 mg p.o. 3 times daily. Patient aware of the risk of tardive dyskinesia with long-term use  2.-Discontinue anticholinergics which are likely contributing to her gastroparesis  3.-Consider domperidone, cisapride, prucalopride  4.-Follow-up appointment      Jay Crystal MD  3/1/2022   14:42 EST

## 2022-03-02 ENCOUNTER — TELEPHONE (OUTPATIENT)
Dept: GASTROENTEROLOGY | Facility: CLINIC | Age: 57
End: 2022-03-02

## 2022-03-03 RX ORDER — METOCLOPRAMIDE HYDROCHLORIDE 15 MG/.07ML
1 SPRAY NASAL 3 TIMES DAILY
Qty: 9.8 ML | Refills: 2 | Status: SHIPPED | OUTPATIENT
Start: 2022-03-03 | End: 2022-06-14

## 2022-04-05 ENCOUNTER — TELEPHONE (OUTPATIENT)
Dept: GASTROENTEROLOGY | Facility: CLINIC | Age: 57
End: 2022-04-05

## 2022-05-11 ENCOUNTER — OFFICE VISIT (OUTPATIENT)
Dept: GASTROENTEROLOGY | Facility: CLINIC | Age: 57
End: 2022-05-11

## 2022-05-11 VITALS
HEART RATE: 73 BPM | DIASTOLIC BLOOD PRESSURE: 73 MMHG | SYSTOLIC BLOOD PRESSURE: 120 MMHG | BODY MASS INDEX: 18.88 KG/M2 | TEMPERATURE: 97.7 F | WEIGHT: 110 LBS

## 2022-05-11 DIAGNOSIS — Z20.822 ENCOUNTER FOR PREPROCEDURE SCREENING LABORATORY TESTING FOR COVID-19: ICD-10-CM

## 2022-05-11 DIAGNOSIS — K31.84 GASTROPARESIS: Primary | ICD-10-CM

## 2022-05-11 DIAGNOSIS — Z12.11 SCREENING FOR COLON CANCER: ICD-10-CM

## 2022-05-11 DIAGNOSIS — K58.1 IRRITABLE BOWEL SYNDROME WITH CONSTIPATION: ICD-10-CM

## 2022-05-11 DIAGNOSIS — Z01.812 ENCOUNTER FOR PREPROCEDURE SCREENING LABORATORY TESTING FOR COVID-19: ICD-10-CM

## 2022-05-11 DIAGNOSIS — K21.9 GASTROESOPHAGEAL REFLUX DISEASE WITHOUT ESOPHAGITIS: ICD-10-CM

## 2022-05-11 PROCEDURE — 99214 OFFICE O/P EST MOD 30 MIN: CPT | Performed by: INTERNAL MEDICINE

## 2022-05-11 RX ORDER — BUDESONIDE AND FORMOTEROL FUMARATE DIHYDRATE 160; 4.5 UG/1; UG/1
AEROSOL RESPIRATORY (INHALATION) AS NEEDED
COMMUNITY
Start: 2022-04-09

## 2022-05-11 NOTE — PROGRESS NOTES
GASTROENTEROLOGY OFFICE NOTE  Toya Apple  [id number]  1965        Chief Complaint   Patient presents with   • Follow-up   • Gastroparesis    • Heartburn        HISTORY OF PRESENT ILLNESS:  Patient presents for follow-up of gastroparesis.  She was prescribed nasal spray metoclopramide and fill the prescription but then became concerned about the possibility of tardive dyskinesia and did not initiate therapy with this.    She continues to have postprandial abdominal distention, bloating unchanged from her prior visit.  In addition she states that when she does intense cardio workouts she will notice some left upper quadrant pain.    Otherwise she is without dysphagia, odynophagia, early satiety, unexplained weight loss or particular changes in her bowel habits.    Remains on lansoprazole 30 mg p.o. daily and Colestid 1 g twice daily.    Further details from her visit and March: Status post EGD by  Over August 2021 which was unremarkable and suggestive of bile reflux.  High-fiber diet and sucrose breath test did not help and a low FODMAP diet was ineffective.  Colestid has been effective in alleviating chronic diarrhea.  Left upper quadrant pain and bloating remain her biggest complaints.  She does admit to regurgitating food ingested even up to 6 hours before.  She is negative for Helicobacter pylori or celiac disease.  Recent EGD by me was negative for eosinophilic esophagitis, H. pylori gastritis or celiac disease    PAST MEDICAL HISTORY  Past Medical History:   Diagnosis Date   • Bile reflux esophagitis    • IBS (irritable bowel syndrome)         PAST SURGICAL HISTORY  Past Surgical History:   Procedure Laterality Date   • COLONOSCOPY     • ENDOSCOPY     • TIBIAL PLATEAU OPEN REDUCTION INTERNAL FIXATION Left 5/14/2020    Procedure: OPEN REDUCTION INTERNAL FIXATION LEFT LATERAL TIBIAL PLATEAU FRACTURE;  Surgeon: Gerard Zheng MD;  Location: [facility];  Service: Orthopedics;  Laterality:  Left;   • WISDOM TOOTH EXTRACTION Bilateral         MEDICATIONS:    Current Outpatient Medications:   •  CALCIUM PO, Take 2 capsules by mouth 2 (Two) Times a Day., Disp: , Rfl:   •  colestipol (COLESTID) 1 g tablet, Take 1 g by mouth 2 (Two) Times a Day., Disp: , Rfl:   •  lansoprazole (PREVACID) 30 MG capsule, Take 30 mg by mouth Daily., Disp: , Rfl:   •  Multiple Vitamins-Minerals (Algae Based Calcium) tablet, Take 1 capsule by mouth Daily., Disp: , Rfl:   •  Symbicort 160-4.5 MCG/ACT inhaler, As Needed., Disp: , Rfl:   •  Gimoti 15 MG/ACT solution, 1 spray into the nostril(s) as directed by provider 3 (Three) Times a Day. 30 minutes before a meal. (Do not exceed 12 weeks, Only one nostril), Disp: 9.8 mL, Rfl: 2    ALLERGIES  No Known Allergies    FAMILY HISTORY:  Family History   Adopted: Yes   Problem Relation Age of Onset   • No Known Problems Mother    • No Known Problems Father    • Breast cancer Neg Hx    • Ovarian cancer Neg Hx    • Endometrial cancer Neg Hx        SOCIAL HISTORY  Social History     Socioeconomic History   • Marital status:    Tobacco Use   • Smoking status: Never Smoker   • Smokeless tobacco: Never Used   Vaping Use   • Vaping Use: Never used   Substance and Sexual Activity   • Alcohol use: No   • Drug use: No   • Sexual activity: Yes           PHYSICAL EXAM   /73 (BP Location: Left arm, Patient Position: Sitting, Cuff Size: Adult)   Pulse 73   Temp 97.7 °F (36.5 °C) (Temporal)   Wt 49.9 kg (110 lb)   LMP  (LMP Unknown)   BMI 18.88 kg/m²   General: Petite white female in no apparent acute distress  HEENT: Anicteric sclera  Neck: Without observed rigidity  Lungs: Grossly normal respirations without labored breathing observed  Abdomen: Without obvious distention noted  Neurologic: Normal cognition and affect      ASSESSMENT  1.-  Suspected gastroparesis.  Clinical history certainly consistent with this and I think the value of a gastric emptying scan is currently limited  rather she start an empiric trial of metoclopramide.  I explained to her that tardive dyskinesia is relatively rare.  It is a less than 1% lifetime risk and is associated with a cumulative lifetime dose and therefore short-term use is not commonly associated with this side effect.  With this understanding she expressed willingness to initiate a trial of metoclopramide 1 spray in 1 nostril half an hour before breakfast lunch and dinner daily for at least a few weeks and if she determines this is helpful she will try to find the minimum dosing necessary for control of her symptoms.  If she finds her consistent dosing is necessary we can look into options such as domperidone and cisapride  2.-  GERD.  Continue lansoprazole  3.-  Cholerrheic diarrhea responsive to Colestid.  Continue  4.-  Patient up-to-date on colon cancer screening with October 2020 colonoscopy revealing adenomatous polyps.  2025 surveillance colonoscopy recommended    PLAN  1.-Nasal spray metoclopramide  2.-Dietary modification gastroparesis  3.-Continue colestipol  4.-Continue lansoprazole  5.-Follow-up appointment in 2 months  6.-Consider domperidone, cisapride etc.  7.-Surveillance colonoscopy October 2025    Jay Crystal MD  5/11/2022   14:56 EDT

## 2022-06-14 ENCOUNTER — OFFICE VISIT (OUTPATIENT)
Dept: ENDOCRINOLOGY | Facility: CLINIC | Age: 57
End: 2022-06-14

## 2022-06-14 VITALS
OXYGEN SATURATION: 98 % | BODY MASS INDEX: 18.95 KG/M2 | SYSTOLIC BLOOD PRESSURE: 110 MMHG | HEART RATE: 65 BPM | WEIGHT: 111 LBS | DIASTOLIC BLOOD PRESSURE: 64 MMHG | HEIGHT: 64 IN

## 2022-06-14 DIAGNOSIS — R79.89 LOW SERUM CORTISOL LEVEL: Primary | ICD-10-CM

## 2022-06-14 PROCEDURE — 99203 OFFICE O/P NEW LOW 30 MIN: CPT | Performed by: INTERNAL MEDICINE

## 2022-06-14 NOTE — PROGRESS NOTES
"     Office Note      Date: 2022  Patient Name: Toya Apple  MRN: [id number]  : 1965    Chief Complaint   Patient presents with   • Low cortisol levels   • Abnormal Lab       History of Present Illness:   Toya Apple is a 56 y.o. female who presents for Low cortisol levels and Abnormal Lab  She is seen as a new patient today  --------------------------------------------  She had gone to dr kingston with lethargy and fatigue and asked that her cortisol level be checked.  It was 3.9 , drawn at 1430.  She has not had weight loss  She does not take symbicort regularly.  She notes no changes to hair or skin.  She craves sweets but not salt.  She notes no light headedness.      Subjective          Review of Systems:   Review of Systems   Constitutional: Positive for fatigue. Negative for unexpected weight change.   Neurological: Positive for weakness.       The following portions of the patient's history were reviewed and updated as appropriate: allergies, current medications, past family history, past medical history, past social history, past surgical history and problem list.    Objective     Visit Vitals  /64   Pulse 65   Ht 162.6 cm (64\")   Wt 50.3 kg (111 lb)   LMP  (LMP Unknown)   SpO2 98%   BMI 19.05 kg/m²       Labs:    CBC w/DIFF  Lab Results   Component Value Date    WBC 5.21 2020    RBC 4.75 2020    HGB 15.0 2020    HCT 43.2 2020    MCV 90.9 2020    MCH 31.6 2020    MCHC 34.7 2020    RDW 11.3 (L) 2020    RDWSD 37.9 2020    MPV 9.7 2020     2020       T4  No results found for: FREET4    TSH  No results found for: TSHBASE     Physical Exam:  Physical Exam  Vitals reviewed.   Constitutional:       Appearance: Normal appearance.   HENT:      Head: Normocephalic and atraumatic.   Eyes:      Extraocular Movements: Extraocular movements intact.   Neck:      Comments: No goiter  Cardiovascular:      Rate and Rhythm: " Normal rate and regular rhythm.   Pulmonary:      Effort: Pulmonary effort is normal. No respiratory distress.   Lymphadenopathy:      Cervical: No cervical adenopathy.   Skin:     Comments: Not abnormaly pigmented    Neurological:      General: No focal deficit present.      Mental Status: She is alert.   Psychiatric:         Mood and Affect: Mood normal.         Thought Content: Thought content normal.         Judgment: Judgment normal.         Assessment / Plan      Assessment & Plan:  Problem List Items Addressed This Visit        Other    Low serum cortisol level (HCC) - Primary    Current Assessment & Plan     Slightly low random cortisol , drawn in the afternoon.   Likely has normal adrenal function but could have partial adrenal suppression from intermittent exogenous inhaled steroids.  Will start by checking am cortisol and acth. Then, if needed, will do zuleyma stim test            Relevant Orders    ACTH    Cortisol           Taiwo Luna MD   06/14/2022

## 2022-06-14 NOTE — ASSESSMENT & PLAN NOTE
Slightly low random cortisol , drawn in the afternoon.   Likely has normal adrenal function but could have partial adrenal suppression from intermittent exogenous inhaled steroids.  Will start by checking am cortisol and acth. Then, if needed, will do zuleyma stim test

## 2022-06-22 ENCOUNTER — PATIENT MESSAGE (OUTPATIENT)
Dept: ENDOCRINOLOGY | Facility: CLINIC | Age: 57
End: 2022-06-22

## 2022-06-28 DIAGNOSIS — R79.89 LOW SERUM CORTISOL LEVEL: ICD-10-CM

## 2022-07-11 ENCOUNTER — TELEPHONE (OUTPATIENT)
Dept: GASTROENTEROLOGY | Facility: CLINIC | Age: 57
End: 2022-07-11

## 2022-07-11 NOTE — TELEPHONE ENCOUNTER
Patient called regarding a referral to a nutritionist that she believes was supposed to have been sent at her last visit, but I don't see any mention of that plan or an order for a referral. Please advise.

## 2022-07-13 DIAGNOSIS — K31.84 GASTROPARESIS: Primary | ICD-10-CM

## 2022-08-03 ENCOUNTER — OFFICE VISIT (OUTPATIENT)
Dept: GASTROENTEROLOGY | Facility: CLINIC | Age: 57
End: 2022-08-03

## 2022-08-03 VITALS
OXYGEN SATURATION: 97 % | HEIGHT: 64 IN | SYSTOLIC BLOOD PRESSURE: 110 MMHG | DIASTOLIC BLOOD PRESSURE: 72 MMHG | BODY MASS INDEX: 18.68 KG/M2 | TEMPERATURE: 97.4 F | WEIGHT: 109.4 LBS | HEART RATE: 66 BPM

## 2022-08-03 DIAGNOSIS — K21.9 GASTROESOPHAGEAL REFLUX DISEASE WITHOUT ESOPHAGITIS: ICD-10-CM

## 2022-08-03 DIAGNOSIS — K58.1 IRRITABLE BOWEL SYNDROME WITH CONSTIPATION: ICD-10-CM

## 2022-08-03 DIAGNOSIS — K31.84 GASTROPARESIS: Primary | ICD-10-CM

## 2022-08-03 PROCEDURE — 99214 OFFICE O/P EST MOD 30 MIN: CPT | Performed by: INTERNAL MEDICINE

## 2022-08-03 NOTE — PROGRESS NOTES
GASTROENTEROLOGY OFFICE NOTE  Toya Apple  [id number]  1965      Chief Complaint   Patient presents with   • Follow-up     Gastroparesis   • Bloating.  Suspected gastroparesis        HISTORY OF PRESENT ILLNESS:  Patient presents for follow-up but for clinically suspected gastroparesis.  She took metoclopramide for about 4 days and noted twitching and therefore discontinued it.  She complains of ongoing problems with abdominal bloating and does not clearly describe any distention.  Review of previous notes where she had indicated certainty with feeling regurgitation of food ingested greater than 4 hours prior is now advised that she is not entirely sure that that is the case.    She has been the subject of extensive GI work-up.  EGD in February 2021 by Dr. Artie Lang failed to reveal any significant abnormalities.  A low FODMAP diet has been ineffective.  Frequent bowel movements were alleviated by use of Colestid.  Repeat EGD 12/21/2021 was also unremarkable.  Colonoscopy by Dr. James Shelley October 2020 revealed hyperplastic polyps.  A 5-year surveillance interval was recommended.    Again her biggest complaint is that of abdominal bloating.  She is tested negative for celiac disease, H. pylori gastritis and has not been noted to have erosive esophagitis.    Recent adherence to gastroparesis friendly diet has been minimally helpful.    Of interest, she has stopped taking Colestid and has not noted a return of the diarrhea that has been resolved by Colestid use.    PAST MEDICAL HISTORY  Past Medical History:   Diagnosis Date   • Bile reflux esophagitis    • IBS (irritable bowel syndrome)         PAST SURGICAL HISTORY  Past Surgical History:   Procedure Laterality Date   • COLONOSCOPY     • ENDOSCOPY     • TIBIAL PLATEAU OPEN REDUCTION INTERNAL FIXATION Left 05/14/2020    Procedure: OPEN REDUCTION INTERNAL FIXATION LEFT LATERAL TIBIAL PLATEAU FRACTURE;  Surgeon: Gerard Zheng MD;  Location:   "[facility];  Service: Orthopedics;  Laterality: Left;   • WISDOM TOOTH EXTRACTION Bilateral         MEDICATIONS:    Current Outpatient Medications:   •  CALCIUM PO, Take 2 capsules by mouth 2 (Two) Times a Day., Disp: , Rfl:   •  lansoprazole (PREVACID) 30 MG capsule, Take 30 mg by mouth Daily., Disp: , Rfl:   •  Multiple Vitamins-Minerals (Algae Based Calcium) tablet, Take 1 capsule by mouth Daily., Disp: , Rfl:   •  Symbicort 160-4.5 MCG/ACT inhaler, As Needed., Disp: , Rfl:   •  colestipol (COLESTID) 1 g tablet, Take 1 g by mouth 2 (Two) Times a Day., Disp: , Rfl:     ALLERGIES  No Known Allergies    FAMILY HISTORY:  Family History   Adopted: Yes   Problem Relation Age of Onset   • No Known Problems Mother    • No Known Problems Father    • Breast cancer Neg Hx    • Ovarian cancer Neg Hx    • Endometrial cancer Neg Hx        SOCIAL HISTORY  Social History     Socioeconomic History   • Marital status:    Tobacco Use   • Smoking status: Never Smoker   • Smokeless tobacco: Never Used   Vaping Use   • Vaping Use: Never used   Substance and Sexual Activity   • Alcohol use: No   • Drug use: No   • Sexual activity: Yes     Partners: Male     Birth control/protection: Post-menopausal         PHYSICAL EXAM   /72 (BP Location: Left arm, Patient Position: Sitting, Cuff Size: Adult)   Pulse 66   Temp 97.4 °F (36.3 °C) (Infrared)   Ht 162.6 cm (64\")   Wt 49.6 kg (109 lb 6.4 oz)   LMP  (LMP Unknown)   SpO2 97%   BMI 18.78 kg/m²   General: Pleasant white female no apparent or acute distress  HEENT: Anicteric sclera  Neck: Without rigidity  Lungs: Grossly normal respiration without labored breathing or audible wheezing  Abdomen: Without visible distention  Neurologic: Normal cognition and affect.  Alert and oriented      ASSESSMENT  1.-Clinically suspected gastroparesis.  Today she is unsure as to whether she really has food retained beyond 4 hours postprandially.  She has failed to respond to dietary " modifications including low FODMAP diet, dietary changes for gastroparesis and is failed to respond to a very brief trial of metoclopramide.  2.-Adenomatous colonic polyps due for surveillance in 2025  3.-Nonulcer dyspepsia  4.-GERD  5.-Cholerrheic diarrhea responsive to Colestid.  No longer requiring Colestid    PLAN  1.-  Patient encouraged to follow-up with her pending registered dietitian visit for dietary recommendations regarding gastroparesis  2.-  Gastric emptying scan.  Nuclear medicine.  3.-  Decrease lansoprazole to 15 mg daily  4.-  Consider domperidone pending results of gastric emptying scan.  She understands that it is not FDA approved and understands risk of QT prolongation therefore baseline and post initiation of therapy electrocardiogram will be obtained if we proceed down this route.  May also consider enrollment in cisapride study if gastroparesis is confirmed  5.-  Return appointment in 3 months  6.-  If gastric emptying study returns nondiagnostic consider functional dyspepsia in which case trial of Celexa, mirtazapine etc. should be considered      Jay Crystal MD  8/4/2022   06:25 EDT

## 2022-08-29 ENCOUNTER — HOSPITAL ENCOUNTER (OUTPATIENT)
Dept: NUTRITION | Facility: HOSPITAL | Age: 57
Discharge: HOME | End: 2022-08-29

## 2022-08-29 PROCEDURE — 97802 MEDICAL NUTRITION INDIV IN: CPT

## 2022-08-29 NOTE — CONSULTS
"Adult Outpatient Nutrition  Assessment/PES    Patient Name:  Toya Apple  YOB: 1965  MRN: [id number]    Assessment Date:  8/29/2022    RD spent a total of 60 minutes with patient today.      Reason for visit:     Patient is a 56 y/o F who is referred today for gastroparesis. She also struggling with reflux sometimes.     Patient has also tried CSID diet and low FODMAP diet without success. The last 4 weeks she's cut way back on fiber and fat. Before making these changes she was having 4-6 bowel movements every morning, now it's down to 1-2. Pt doesn't eat fried foods or vegetables at the moment.     RD mentioned looking into a food sensitivities test. Since pt is so active, she does need adequate fat intake, but coming from natural fat sources- nuts, beans, protein sources, etc.     RD plans to educate on probiotics at follow-up. RD also emailed stomach acid test directions to test stomach acid.     Session Details:     Attendees: Patient    Anthropometrics:     Ht Readings from Last 1 Encounters:   08/03/22 162.6 cm (64\")      Wt Readings from Last 1 Encounters:   08/03/22 49.6 kg (109 lb 6.4 oz)      BMI Readings from Last 1 Encounters:   08/03/22 18.78 kg/m²      Ideal body weight: 54.7 kg (120 lb 9.5 oz)           Pertinent Medications/Supplements:    calcim  Beet root  Multi     Nutrition Assessment:     Food assistance programs: None  Frequency of eating out: Sometimes   Food allergies/intolerances/aversions: None that she knows of  Difficulty chewing: None  Difficulty swallowing: None  Gastrointestinal symptoms that impact intake or food choices: diarrhea    Diet recall: please refer to questionnaire submission     Physical activity:    Activity or exercise program: very active, exercises for about an hour every day.    Assessed Needs:     Estimated energy needs: needs weren't discussed at initial visit    PES Statement:     Altered GI function related to frequent, loose bowel movements " as evidenced by dietary recall and .      Discussion:     Consistency of meals: We discussed the importance of eating consistent, balanced meals to meet nutrient needs to promote satiety and satisfaction when eating. RD recommended patient to try and incorporate a small meal or snack in the morning and to avoid overeating and snacking throughout the day. RD explained meal patterns should be individualized from person to person. We discussed how sometimes cravings are trigger due to skipping meals and not incorporating all 3 categories of nutrients.      The components of a healthy meal and snack: We discussed how the three main nutrients our bodies need are protein, carbohydrates, and fat. RD recommended the patient aim to have a source of lean protein, fiber rich carbohydrates, and heart healthy fats with each of her meals and snacks. RD provided examples, such as having some yogurt with her sandwich for additional carbohydrates and protein rather than just having a sandwich by itself. We discussed the benefits this provides, such as meeting nutrient needs, and promoting satiety and satisfaction when eating.     Healthy fat: RD encouraged eating natural sources of fat, nuts, nut butters, protein, etc. RD recommended avoiding high fat foods like processed pastries/bakery items, some meals when eating out, etc.     Goals:    1. Eating small, balanced meals.     Resources Provided:     RD provided resources after session:  Gastroparesis tips  Eating to feel your best  [facility] gastroparesis         Total of 60 minutes spent with patient on nutrition counseling. Education based on Academy of Nutrition and Dietetics guidelines. Patient was provided with RD's contact information. Follow up visit is scheduled for 10/5 at 3:45 PM. Thank you for this referral.      Electronically signed by:  Vanda Peacock RD  08/29/22 12:42 EDT

## 2022-09-21 ENCOUNTER — TRANSCRIBE ORDERS (OUTPATIENT)
Dept: ADMINISTRATIVE | Facility: HOSPITAL | Age: 57
End: 2022-09-21

## 2022-09-21 DIAGNOSIS — Z12.31 VISIT FOR SCREENING MAMMOGRAM: Primary | ICD-10-CM

## 2022-09-22 ENCOUNTER — HOSPITAL ENCOUNTER (OUTPATIENT)
Dept: NUCLEAR MEDICINE | Facility: HOSPITAL | Age: 57
Discharge: HOME | End: 2022-09-22

## 2022-09-22 DIAGNOSIS — K31.84 GASTROPARESIS: ICD-10-CM

## 2022-09-22 PROCEDURE — 0 TECHNETIUM SULFUR COLLOID: Performed by: INTERNAL MEDICINE

## 2022-09-22 PROCEDURE — 78264 GASTRIC EMPTYING IMG STUDY: CPT

## 2022-09-22 PROCEDURE — A9541 TC99M SULFUR COLLOID: HCPCS | Performed by: INTERNAL MEDICINE

## 2022-09-22 RX ADMIN — TECHNETIUM TC 99M SULFUR COLLOID 1 DOSE: KIT at 09:06

## 2022-09-23 NOTE — PROGRESS NOTES
Unremarkable gastric emptying scan.  No evidence of gastroparesis.  Patient does complain of bloating.  Has upcoming appointment November 9.  Further recommendations at that time pending progress and complaints at that time

## 2022-10-04 ENCOUNTER — LAB (OUTPATIENT)
Dept: LAB | Facility: HOSPITAL | Age: 57
End: 2022-10-04

## 2022-10-04 ENCOUNTER — TRANSCRIBE ORDERS (OUTPATIENT)
Dept: LAB | Facility: HOSPITAL | Age: 57
End: 2022-10-04

## 2022-10-04 DIAGNOSIS — Z71.3 DIETARY COUNSELING: Primary | ICD-10-CM

## 2022-10-05 ENCOUNTER — APPOINTMENT (OUTPATIENT)
Dept: NUTRITION | Facility: HOSPITAL | Age: 57
End: 2022-10-05

## 2022-12-08 ENCOUNTER — HOSPITAL ENCOUNTER (OUTPATIENT)
Dept: MAMMOGRAPHY | Facility: HOSPITAL | Age: 57
Discharge: HOME | End: 2022-12-08
Admitting: OBSTETRICS & GYNECOLOGY

## 2022-12-08 DIAGNOSIS — Z12.31 VISIT FOR SCREENING MAMMOGRAM: ICD-10-CM

## 2022-12-08 PROCEDURE — 77063 BREAST TOMOSYNTHESIS BI: CPT

## 2022-12-08 PROCEDURE — 77063 BREAST TOMOSYNTHESIS BI: CPT | Performed by: RADIOLOGY

## 2022-12-08 PROCEDURE — 77067 SCR MAMMO BI INCL CAD: CPT | Performed by: RADIOLOGY

## 2022-12-08 PROCEDURE — 77067 SCR MAMMO BI INCL CAD: CPT

## 2022-12-20 ENCOUNTER — OFFICE VISIT (OUTPATIENT)
Dept: GASTROENTEROLOGY | Facility: CLINIC | Age: 57
End: 2022-12-20

## 2022-12-20 VITALS
HEART RATE: 59 BPM | OXYGEN SATURATION: 97 % | BODY MASS INDEX: 18.47 KG/M2 | DIASTOLIC BLOOD PRESSURE: 75 MMHG | SYSTOLIC BLOOD PRESSURE: 126 MMHG | HEIGHT: 64 IN | WEIGHT: 108.2 LBS

## 2022-12-20 DIAGNOSIS — K59.89 VISCERAL HYPERSENSITIVITY SYNDROME: Primary | ICD-10-CM

## 2022-12-20 DIAGNOSIS — K90.89 BILE SALT-INDUCED DIARRHEA: ICD-10-CM

## 2022-12-20 DIAGNOSIS — K30 FUNCTIONAL DYSPEPSIA: ICD-10-CM

## 2022-12-20 DIAGNOSIS — Z86.010 HISTORY OF ADENOMATOUS POLYP OF COLON: ICD-10-CM

## 2022-12-20 DIAGNOSIS — K21.9 GASTROESOPHAGEAL REFLUX DISEASE, UNSPECIFIED WHETHER ESOPHAGITIS PRESENT: ICD-10-CM

## 2022-12-20 PROBLEM — Z86.0101 HISTORY OF ADENOMATOUS POLYP OF COLON: Status: ACTIVE | Noted: 2022-12-20

## 2022-12-20 PROCEDURE — 99214 OFFICE O/P EST MOD 30 MIN: CPT | Performed by: INTERNAL MEDICINE

## 2022-12-20 RX ORDER — AMITRIPTYLINE HYDROCHLORIDE 25 MG/1
50 TABLET, FILM COATED ORAL NIGHTLY
Qty: 60 TABLET | Refills: 11 | Status: SHIPPED | OUTPATIENT
Start: 2022-12-20 | End: 2023-03-02

## 2022-12-20 NOTE — PROGRESS NOTES
"GASTROENTEROLOGY OFFICE NOTE  Toya Apple  [id number]  1965      Chief Complaint   Patient presents with   • Pt is her for follow up for gastric emptying study, abdomin        HISTORY OF PRESENT ILLNESS:  For the visit to me for this patient who I initially saw in March when she presented for evaluation of abdominal bloating and pain that began in 2020 when she was under a lot of stress and anxiety.  She was treated with a proton pump inhibitor.  During that time her mother , patient developed COVID-19 infection and developed sore throat which became somewhat persistent.  He had an upper endoscopy done 2021 by Dr. Artie Lang which was unremarkable and felt to be suggestive of bile reflux.  She started also noticing a rib pain which was worse postprandially, radiating, fairly severe at times but always postprandial.  Dietary measures, sucrose breath test and a low FODMAP diet have all been ineffective.  She was also having frequent bowel movements and Colestid was helpful in alleviating the diarrhea but not in alleviating the abdominal pain.    More recently she underwent gastric emptying scan which revealed near complete emptying of the stomach at about 4 hours excluding gastroparesis to the extent that the studies are helpful.    She again describes a \"buildup of pressure \"mostly in the left upper abdomen and left side of the abdomen is seems to be worse at night but also in the morning when she gets up and is not necessarily postprandial and seems to be better if she lays on her right side.  Symptoms are daily and moderate in intensity but do come and go and are not associated with dysphagia, odynophagia, early satiety or unexplained weight loss.    It was suspected that she had gastroparesis but again brief most recent study was not suggestive of this and today she does not endorse that her symptoms are worse postprandially.    Surveillance colonoscopy is due in .    PAST " "MEDICAL HISTORY  Past Medical History:   Diagnosis Date   • Bile reflux esophagitis    • IBS (irritable bowel syndrome)         PAST SURGICAL HISTORY  Past Surgical History:   Procedure Laterality Date   • COLONOSCOPY     • ENDOSCOPY     • TIBIAL PLATEAU OPEN REDUCTION INTERNAL FIXATION Left 05/14/2020    Procedure: OPEN REDUCTION INTERNAL FIXATION LEFT LATERAL TIBIAL PLATEAU FRACTURE;  Surgeon: Gerard Zheng MD;  Location: [facility];  Service: Orthopedics;  Laterality: Left;   • WISDOM TOOTH EXTRACTION Bilateral         MEDICATIONS:    Current Outpatient Medications:   •  CALCIUM PO, Take 2 capsules by mouth 2 (Two) Times a Day., Disp: , Rfl:   •  colestipol (COLESTID) 1 g tablet, Take 1 g by mouth 2 (Two) Times a Day., Disp: , Rfl:   •  lansoprazole (PREVACID) 30 MG capsule, Take 30 mg by mouth Daily., Disp: , Rfl:   •  Multiple Vitamins-Minerals (Algae Based Calcium) tablet, Take 1 capsule by mouth Daily., Disp: , Rfl:   •  Symbicort 160-4.5 MCG/ACT inhaler, As Needed., Disp: , Rfl:     ALLERGIES  No Known Allergies    FAMILY HISTORY:  Family History   Adopted: Yes   Problem Relation Age of Onset   • No Known Problems Mother    • No Known Problems Father    • Breast cancer Neg Hx    • Ovarian cancer Neg Hx    • Endometrial cancer Neg Hx        SOCIAL HISTORY  Social History     Socioeconomic History   • Marital status:    Tobacco Use   • Smoking status: Never   • Smokeless tobacco: Never   Vaping Use   • Vaping Use: Never used   Substance and Sexual Activity   • Alcohol use: Never   • Drug use: Never   • Sexual activity: Yes     Partners: Male     Birth control/protection: Post-menopausal         PHYSICAL EXAM   /75 (BP Location: Left arm, Patient Position: Sitting, Cuff Size: Adult)   Pulse 59   Ht 162.6 cm (64\")   Wt 49.1 kg (108 lb 3.2 oz)   LMP  (LMP Unknown)   SpO2 97%   BMI 18.57 kg/m²   General: Alert and oriented x 3. In no apparent or acute distress.  and No stigmata of " chronic liver disease  HEENT: Anicteric sclerae. Normal oropharynx  Neck: Supple. Without lymphadenopathy  CV: Regular rate and rhythm, S1, S2  Lungs: Clear to ausculation. Without rales, rhonchi and wheezing  Abdomen:  Soft,non-distended without palpable masses or hepatosplenomeagaly, areas of rebound tenderness or guarding.   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 nightly titrated to response and tolerance  2.-  Consider trial of Celexa 20 to 60 mg p.o. daily if unresponsive to Elavil  3.-  Consider trial of mirtazapine 15 to 30 mg p.o. daily for failure to Celexa and amitriptyline.  4.-  Patient will call us as she cycles through these options to let us know that she is tolerating these meds and to move onto the next option if not responding.  5.-  Return appointment in 3 months        Jay Crystal MD  12/20/2022   13:57 EST

## 2023-03-02 ENCOUNTER — OFFICE VISIT (OUTPATIENT)
Dept: OBSTETRICS AND GYNECOLOGY | Facility: CLINIC | Age: 58
End: 2023-03-02
Payer: COMMERCIAL

## 2023-03-02 VITALS
BODY MASS INDEX: 21.2 KG/M2 | SYSTOLIC BLOOD PRESSURE: 118 MMHG | HEIGHT: 60 IN | WEIGHT: 108 LBS | DIASTOLIC BLOOD PRESSURE: 70 MMHG

## 2023-03-02 DIAGNOSIS — Z01.419 PAP TEST, AS PART OF ROUTINE GYNECOLOGICAL EXAMINATION: Primary | ICD-10-CM

## 2023-03-02 PROCEDURE — 99396 PREV VISIT EST AGE 40-64: CPT | Performed by: OBSTETRICS & GYNECOLOGY

## 2023-03-02 RX ORDER — CITALOPRAM 10 MG/1
TABLET ORAL
COMMUNITY
Start: 2023-01-19

## 2023-03-02 NOTE — PROGRESS NOTES
Gynecologic Annual Exam Note        GYN Annual Exam     CC - Here for annual exam.        HPI  Toya Apple is a 57 y.o. female, , who presents for annual well woman exam as a established patient.  She is postmenopausal. Denies vaginal bleeding.  Patient reports problems with: none. Since her last visit the patient was diagnosed with osteopenia-last BDS 2022. Partner Status: Marital Status: .  She is is sexually active. She has not had new partners.. STD testing recommendations have been explained to the patient and she does not desire STD testing.    Additional OB/GYN History   On HRT? No    Last Pap : 2021. Results: negative. HPV: not done.   Last Completed Pap Smear     This patient has no relevant Health Maintenance data.        History of abnormal Pap smear: no  Family history of uterine, colon, breast, or ovarian cancer: unknown-patient adopted  Performs monthly Self-Breast Exam: yes  Last mammogram: 2022. Done at .    Last Completed Mammogram          MAMMOGRAM (Yearly) Next due on 2022  Mammo Screening Digital Tomosynthesis Bilateral With CAD    2021  Mammo Screening Digital Tomosynthesis Bilateral With CAD    10/15/2020  Mammo Screening Digital Tomosynthesis Bilateral With CAD    2019  Mammo Screening Digital Tomosynthesis Bilateral With CAD    2018  Mammo Screening Digital Tomosynthesis Bilateral With CAD    Only the first 5 history entries have been loaded, but more history exists.              Last colonoscopy: has had a colonoscopy 3 year(s) ago.    Last Completed Colonoscopy          COLORECTAL CANCER SCREENING (COLONOSCOPY - Every 10 Years) Next due on 10/13/2030    10/13/2020  SCANNED - COLONOSCOPY                  Last bone density scan (DEXA): On 2022 and results were Osteopenia  Exercises Regularly: yes  Feelings of Anxiety or Depression: no      Tobacco Usage?: No       Current Outpatient Medications:   •  CALCIUM PO,  Take 2 capsules by mouth 2 (Two) Times a Day., Disp: , Rfl:   •  Multiple Vitamins-Minerals (Algae Based Calcium) tablet, Take 1 capsule by mouth Daily., Disp: , Rfl:   •  Symbicort 160-4.5 MCG/ACT inhaler, As Needed., Disp: , Rfl:   •  citalopram (CeleXA) 10 MG tablet, , Disp: , Rfl:   •  colestipol (COLESTID) 1 g tablet, Take 1 tablet by mouth 2 (Two) Times a Day., Disp: , Rfl:     Patient denies the need for medication refills today.    OB History        2    Para   2    Term   2            AB        Living           SAB        IAB        Ectopic        Molar        Multiple        Live Births                    Past Medical History:   Diagnosis Date   • Bile reflux esophagitis    • IBS (irritable bowel syndrome)    • Osteopenia         Past Surgical History:   Procedure Laterality Date   • COLONOSCOPY     • ENDOSCOPY     • TIBIAL PLATEAU OPEN REDUCTION INTERNAL FIXATION Left 2020    Procedure: OPEN REDUCTION INTERNAL FIXATION LEFT LATERAL TIBIAL PLATEAU FRACTURE;  Surgeon: Gerard Zheng MD;  Location: [facility];  Service: Orthopedics;  Laterality: Left;   • WISDOM TOOTH EXTRACTION Bilateral        Health Maintenance   Topic Date Due   • DXA SCAN  Never done   • TDAP/TD VACCINES (1 - Tdap) Never done   • ZOSTER VACCINE (1 of 2) Never done   • HEPATITIS C SCREENING  Never done   • ANNUAL PHYSICAL  Never done   • COVID-19 Vaccine (4 - Booster for Pfizer series) 2022   • INFLUENZA VACCINE  2022   • PAP SMEAR  2022   • Annual Gynecologic Pelvic and Breast Exam  2022   • MAMMOGRAM  2023   • COLORECTAL CANCER SCREENING  10/13/2030   • Pneumococcal Vaccine 0-64  Aged Out       The additional following portions of the patient's history were reviewed and updated as appropriate: allergies, current medications, past family history, past medical history, past social history and past surgical history.    Review of Systems   Constitutional: Negative.    HENT:  "Negative.    Eyes: Negative.    Respiratory: Negative.    Cardiovascular: Negative.    Gastrointestinal: Negative.    Endocrine: Negative.    Genitourinary: Negative.    Musculoskeletal: Negative.    Skin: Negative.    Allergic/Immunologic: Negative.    Neurological: Negative.    Hematological: Negative.    Psychiatric/Behavioral: Negative.        I have reviewed and agree with the HPI, ROS, and historical information as entered above. Cody Rojo MD    Objective   /70   Ht 152.4 cm (60\")   Wt 49 kg (108 lb)   LMP  (LMP Unknown)   BMI 21.09 kg/m²     Physical Exam  Vitals and nursing note reviewed. Exam conducted with a chaperone present.   Constitutional:       Appearance: She is well-developed.   HENT:      Head: Normocephalic and atraumatic.   Neck:      Thyroid: No thyroid mass or thyromegaly.   Cardiovascular:      Rate and Rhythm: Normal rate and regular rhythm.      Heart sounds: No murmur heard.  Pulmonary:      Effort: Pulmonary effort is normal. No retractions.      Breath sounds: Normal breath sounds. No wheezing, rhonchi or rales.   Chest:      Chest wall: No mass or tenderness.   Breasts:     Right: Normal. No mass, nipple discharge, skin change or tenderness.      Left: Normal. No mass, nipple discharge, skin change or tenderness.   Abdominal:      General: Bowel sounds are normal.      Palpations: Abdomen is soft. Abdomen is not rigid. There is no mass.      Tenderness: There is no abdominal tenderness. There is no guarding.      Hernia: No hernia is present. There is no hernia in the left inguinal area.   Genitourinary:     Labia:         Right: No rash, tenderness or lesion.         Left: No rash, tenderness or lesion.       Vagina: Normal. No vaginal discharge or lesions.      Cervix: No cervical motion tenderness, discharge, lesion or cervical bleeding.      Uterus: Normal. Not enlarged, not fixed and not tender.       Adnexa:         Right: No mass or tenderness.          Left: No " mass or tenderness.        Rectum: No external hemorrhoid.   Musculoskeletal:      Cervical back: Normal range of motion. No muscular tenderness.   Neurological:      Mental Status: She is alert and oriented to person, place, and time.   Psychiatric:         Behavior: Behavior normal.            Assessment and Plan    Problem List Items Addressed This Visit    None  Visit Diagnoses     Pap test, as part of routine gynecological examination    -  Primary    Relevant Orders    LIQUID-BASED PAP SMEAR, [facility] ([facility])          1. GYN annual well woman exam.   2. Reviewed monthly self breast exams.  Instructed to call with lumps, pain, or breast discharge.  Yearly mammograms ordered.  3. Ordered mammogram today.  4. Recommended use of Vitamin D and getting adequate calcium in her diet. (1500mg)  5. Colonoscopy recommended.  6. RTC in 1 year or PRN with problems.  7. Other: has some sort of Abd sx   8. No follow-ups on file.     Cody Rojo MD  03/02/2023

## 2023-03-06 LAB — REF LAB TEST METHOD: NORMAL

## 2023-10-04 ENCOUNTER — OFFICE VISIT (OUTPATIENT)
Dept: GASTROENTEROLOGY | Facility: CLINIC | Age: 58
End: 2023-10-04
Payer: COMMERCIAL

## 2023-10-04 VITALS
HEIGHT: 64 IN | WEIGHT: 111.4 LBS | BODY MASS INDEX: 19.02 KG/M2 | DIASTOLIC BLOOD PRESSURE: 88 MMHG | SYSTOLIC BLOOD PRESSURE: 118 MMHG

## 2023-10-04 DIAGNOSIS — K90.89 BILE SALT-INDUCED DIARRHEA: ICD-10-CM

## 2023-10-04 DIAGNOSIS — K30 FUNCTIONAL DYSPEPSIA: ICD-10-CM

## 2023-10-04 DIAGNOSIS — K59.89 VISCERAL HYPERSENSITIVITY SYNDROME: Primary | ICD-10-CM

## 2023-10-04 DIAGNOSIS — K21.9 GASTROESOPHAGEAL REFLUX DISEASE WITHOUT ESOPHAGITIS: ICD-10-CM

## 2023-10-04 DIAGNOSIS — Z86.010 HISTORY OF ADENOMATOUS POLYP OF COLON: ICD-10-CM

## 2023-10-04 RX ORDER — FLUOXETINE HYDROCHLORIDE 40 MG/1
CAPSULE ORAL
COMMUNITY
Start: 2023-09-29

## 2023-10-04 RX ORDER — MIRTAZAPINE 15 MG/1
15 TABLET, FILM COATED ORAL NIGHTLY
Qty: 30 TABLET | Refills: 11 | Status: SHIPPED | OUTPATIENT
Start: 2023-10-04

## 2023-10-04 NOTE — PROGRESS NOTES
"GASTROENTEROLOGY OFFICE NOTE  Toya Apple  [id number]  1965      Chief Complaint   Patient presents with    Bloating.  Visceral hypersensitivity syndrome.  Functional         HISTORY OF PRESENT ILLNESS:  Patient presents for follow-up.  58-year-old white female who I last saw on December 20, 2022.  At that time she had already been the recipient of an extensive GI work-up.  Prior to that visit she had developed abdominal bloating and pain have begun doing a very stressful period in her life.    Upper endoscopy in February 2021 was unremarkable.  That was performed by Dr. Artie Lang.  Bile reflux was suspected to be a cause for her symptoms.  She also noted at that time a postprandial pain in her ribs.  Sucrose breath test, low FODMAP diet were ineffective.  She had also complained of frequent bowel movements.  Colestipol/Colestid had helped with diarrhea that she had been having but was ineffective in resolving abdominal pain.    Gastric emptying scan revealed complete emptying of the stomach at 4 hours.    She described a buildup of pressure in the left upper abdomen and left side of the abdomen worse at night but also in the morning when she would get up but not necessarily postprandial.  It seems to be somewhat positional, worse when laying on her right side.  Symptoms were daily, moderate intensity and were intermittent.    She presents today with a decrease in her \"throat pain \".  Elavil did not help her symptoms.  She has a lot of regurgitation which occurs on a daily basis and it is unclear whether this is meal related or not.  A trial of metoclopramide was ineffective in resolving her symptoms.    Fluoxetine/Prozac was prescribed to help with her GI symptoms but she has found it to be ineffective.  Low FODMAP diet has been ineffective.    When I last saw her in December 2022 is felt that she has had some element of visceral hypersensitivity syndrome/functional dyspepsia and there is no evidence " "of gastroparesis.    She has history of adenomatous colonic polyps and is due for surveillance colonoscopy 2025.    She does have, as detailed above, cholerrheic/bile salt induced diarrhea responsive to Colestid in the past which she has not    PAST MEDICAL HISTORY  Past Medical History:    Bile reflux esophagitis    IBS (irritable bowel syndrome)    Osteopenia        PAST SURGICAL HISTORY  Past Surgical History:    COLONOSCOPY    ENDOSCOPY    TIBIAL PLATEAU OPEN REDUCTION INTERNAL FIXATION    Procedure: OPEN REDUCTION INTERNAL FIXATION LEFT LATERAL TIBIAL PLATEAU FRACTURE;  Surgeon: Gerard Zheng MD;  Location: [facility];  Service: Orthopedics;  Laterality: Left;    WISDOM TOOTH EXTRACTION        MEDICATIONS:    Current Outpatient Medications:     CALCIUM PO, Take 2 capsules by mouth 2 (Two) Times a Day., Disp: , Rfl:     FLUoxetine (PROzac) 40 MG capsule, , Disp: , Rfl:     Multiple Vitamins-Minerals (Algae Based Calcium) tablet, Take 1 capsule by mouth Daily., Disp: , Rfl:     mirtazapine (Remeron) 15 MG tablet, Take 1 tablet by mouth Every Night., Disp: 30 tablet, Rfl: 11    ALLERGIES  has No Known Allergies.    FAMILY HISTORY:  Cancer-related family history is negative for Breast cancer, Ovarian cancer, Endometrial cancer, and Uterine cancer. She was adopted.  Colon Cancer-related family history is not on file. She was adopted.    SOCIAL HISTORY  She  reports that she has never smoked. She has never used smokeless tobacco. She reports that she does not drink alcohol and does not use drugs.       PHYSICAL EXAM   /88 (BP Location: Right arm, Patient Position: Sitting, Cuff Size: Adult)   Ht 162.6 cm (64\")   Wt 50.5 kg (111 lb 6.4 oz)   LMP  (LMP Unknown)   BMI 19.12 kg/m²   General: Alert and oriented x 3. In no apparent or acute distress.  and No stigmata of chronic liver disease  HEENT: Anicteric sclerae. Normal oropharynx  Neck: Supple. Without lymphadenopathy  CV: Regular rate and rhythm, " S1, S2  Lungs: Clear to ausculation. Without rales, rhonchi and wheezing  Abdomen:  Soft,non-distended without palpable masses or hepatosplenomeagaly, areas of rebound tenderness or guarding.   Extremeties: without clubbing, cyanosis or edema  Neurologic:  Alert and oriented x 3 without focal motor or sensory deficits  Rectal exam: deferred       ASSESSMENT  1.-Visceral hypersensitivity syndrome/functional dyspepsia.  Unresponsive to amitriptyline/Elavil or fluoxetine/Prozac.  Trial of mirtazapine/Remeron recommended.  Start low-dose, 15 mg/day.  Consider increasing to 30 mg depending on tolerance and response  2.-History of adenomatous colon polyps  3.-Status post unremarkable EGD December 2021  4.-History of bile salt induced/cholerrheic diarrhea responsive to colestipol    PLAN  1.-Discontinue fluoxetine  2.-Initiate mirtazapine 15 to 30 mg p.o. nightly  3.-GI clinic follow-up      Jay Crystal MD  10/7/2023   14:58 EDT

## 2023-12-04 ENCOUNTER — TRANSCRIBE ORDERS (OUTPATIENT)
Dept: ADMINISTRATIVE | Facility: HOSPITAL | Age: 58
End: 2023-12-04
Payer: COMMERCIAL

## 2023-12-04 DIAGNOSIS — Z12.31 SCREENING MAMMOGRAM FOR BREAST CANCER: Primary | ICD-10-CM

## 2024-01-09 ENCOUNTER — OFFICE VISIT (OUTPATIENT)
Dept: GASTROENTEROLOGY | Facility: CLINIC | Age: 59
End: 2024-01-09
Payer: COMMERCIAL

## 2024-01-09 VITALS
SYSTOLIC BLOOD PRESSURE: 115 MMHG | WEIGHT: 120.6 LBS | HEIGHT: 64 IN | BODY MASS INDEX: 20.59 KG/M2 | DIASTOLIC BLOOD PRESSURE: 82 MMHG

## 2024-01-09 DIAGNOSIS — K90.89 BILE SALT-INDUCED DIARRHEA: ICD-10-CM

## 2024-01-09 DIAGNOSIS — K30 FUNCTIONAL DYSPEPSIA: ICD-10-CM

## 2024-01-09 DIAGNOSIS — Z86.010 HISTORY OF ADENOMATOUS POLYP OF COLON: ICD-10-CM

## 2024-01-09 DIAGNOSIS — R11.10 REGURGITATION OF FOOD: Primary | ICD-10-CM

## 2024-01-09 DIAGNOSIS — K59.89 VISCERAL HYPERSENSITIVITY SYNDROME: ICD-10-CM

## 2024-01-09 DIAGNOSIS — R11.10 RUMINATION: ICD-10-CM

## 2024-01-09 PROCEDURE — 99214 OFFICE O/P EST MOD 30 MIN: CPT | Performed by: INTERNAL MEDICINE

## 2024-01-09 RX ORDER — CELECOXIB 200 MG/1
CAPSULE ORAL AS NEEDED
COMMUNITY
Start: 2024-01-02

## 2024-01-09 NOTE — PROGRESS NOTES
"GASTROENTEROLOGY OFFICE NOTE  Toya Apple  [id number]  1965      Chief Complaint   Patient presents with    Visceral Hypersensitivity Syndrome    Regurgitation.  Recurrent.        HISTORY OF PRESENT ILLNESS:  Patient presents for follow-up.  Extensive prior GI workup including endoscopies, gastric emptying scan and empiric trial of various medications which have failed to resolve her symptoms.  On her last visit it was felt that she might have visceral hypersensitivity syndrome.  She was prescribed mirtazapine 15 mg/day and she tells me that this makes her \"sleep like a baby \"but she does not feel that she could tolerate a higher dose.  It has not impacted her GI symptoms.    When asked what the biggest symptom that continues to plague her from a GI standpoint she indicated that it was the regurgitation of food.  This occurs immediately postprandially and she will have multiple episodes of regurgitating food.  Interestingly she had 2 episode of nocturnal awakening twice in the past week where she vomited some bilious gastric contents.  Her throat burning has markedly improved while on mirtazapine.    In the past she has failed a trial of prokinetic agents specifically intranasal metoclopramide.  She has failed low FODMAP diet.    She has a history of adenomatous colonic polyps due for surveillance colonoscopy in 2025.    She has bile salt induced diarrhea responsive to Colestid.    PAST MEDICAL HISTORY  Past Medical History:    Bile reflux esophagitis    IBS (irritable bowel syndrome)    Osteopenia        PAST SURGICAL HISTORY  Past Surgical History:    COLONOSCOPY    ENDOSCOPY    TIBIAL PLATEAU OPEN REDUCTION INTERNAL FIXATION    Procedure: OPEN REDUCTION INTERNAL FIXATION LEFT LATERAL TIBIAL PLATEAU FRACTURE;  Surgeon: Gerard Zheng MD;  Location: [facility];  Service: Orthopedics;  Laterality: Left;    UPPER GASTROINTESTINAL ENDOSCOPY    WISDOM TOOTH EXTRACTION        MEDICATIONS:    Current " "Outpatient Medications:     CALCIUM PO, Take 2 capsules by mouth 2 (Two) Times a Day., Disp: , Rfl:     celecoxib (CeleBREX) 200 MG capsule, As Needed., Disp: , Rfl:     FLUoxetine (PROzac) 40 MG capsule, , Disp: , Rfl:     mirtazapine (Remeron) 15 MG tablet, Take 1 tablet by mouth Every Night., Disp: 30 tablet, Rfl: 11    Multiple Vitamins-Minerals (Algae Based Calcium) tablet, Take 1 capsule by mouth Daily., Disp: , Rfl:     ALLERGIES  has No Known Allergies.    FAMILY HISTORY:  Cancer-related family history is negative for Breast cancer, Ovarian cancer, Endometrial cancer, and Uterine cancer. She was adopted.  Colon Cancer-related family history is not on file. She was adopted.    SOCIAL HISTORY  She  reports that she has never smoked. She has never used smokeless tobacco. She reports that she does not drink alcohol and does not use drugs.      PHYSICAL EXAM   /82 (BP Location: Left arm, Patient Position: Sitting, Cuff Size: Adult)   Ht 162.6 cm (64\")   Wt 54.7 kg (120 lb 9.6 oz)   LMP  (LMP Unknown)   BMI 20.70 kg/m²   General: Pleasant, no apparent acute distress.  Alert and oriented.  HEENT: Anicteric sclera  Lungs: Grossly normal respiration without labored breathing or audible wheezing noted.  Speaking in full sentences  Abdomen: Without gross or obvious distention  Neurologic: Normal cognition and affect.  Alert and oriented      ASSESSMENT  1.-Rumination syndrome.  Patient presents with symptoms very suggestive of rumination syndrome with multiple episodes of regurgitation of food.  This is her main complaint today.  We reviewed with her the definition of rumination syndrome and she agrees at this time if it is her most prominent symptoms today and she is open to exploring this option  2.-Suspected gastroparesis.  No response to previous trial of intranasal metoclopramide  3.-GERD  4.-Adenomatous colonic polyps in 2020 colonoscopy.  Due for surveillance October 2025.    PLAN  1.-Continue " mirtazapine/Remeron 15 mg p.o. nightly.  At the very least is helping her sleep very well.  She is unlikely to tolerate a higher dose given her somnolence  2.-Initiate breathing exercises for rumination syndrome.  Patient was directed to online instructional videos for diaphragmatic breathing  3.-Follow-up appointment in 3 months  4.-Surveillance colonoscopy due October 2025      Jay Crystal MD  1/9/2024   17:10 EST

## 2024-01-23 ENCOUNTER — HOSPITAL ENCOUNTER (OUTPATIENT)
Dept: MAMMOGRAPHY | Facility: HOSPITAL | Age: 59
Discharge: HOME | End: 2024-01-23
Admitting: OBSTETRICS & GYNECOLOGY
Payer: COMMERCIAL

## 2024-01-23 DIAGNOSIS — Z12.31 SCREENING MAMMOGRAM FOR BREAST CANCER: ICD-10-CM

## 2024-01-23 PROCEDURE — 77063 BREAST TOMOSYNTHESIS BI: CPT | Performed by: RADIOLOGY

## 2024-01-23 PROCEDURE — 77063 BREAST TOMOSYNTHESIS BI: CPT

## 2024-01-23 PROCEDURE — 77067 SCR MAMMO BI INCL CAD: CPT

## 2024-01-23 PROCEDURE — 77067 SCR MAMMO BI INCL CAD: CPT | Performed by: RADIOLOGY

## 2024-03-05 ENCOUNTER — OFFICE VISIT (OUTPATIENT)
Dept: OBSTETRICS AND GYNECOLOGY | Facility: CLINIC | Age: 59
End: 2024-03-05
Payer: COMMERCIAL

## 2024-03-05 VITALS
SYSTOLIC BLOOD PRESSURE: 100 MMHG | HEIGHT: 64 IN | DIASTOLIC BLOOD PRESSURE: 70 MMHG | WEIGHT: 119 LBS | BODY MASS INDEX: 20.32 KG/M2

## 2024-03-05 DIAGNOSIS — Z01.419 PAP TEST, AS PART OF ROUTINE GYNECOLOGICAL EXAMINATION: Primary | ICD-10-CM

## 2024-03-05 PROCEDURE — 99396 PREV VISIT EST AGE 40-64: CPT | Performed by: OBSTETRICS & GYNECOLOGY

## 2024-03-05 NOTE — PROGRESS NOTES
Gynecologic Annual Exam Note        GYN Annual Exam     CC - Here for annual exam.        HPI  Toya Apple is a 58 y.o. female, , who presents for annual well woman exam as a established patient.  She is postmenopausal.. Denies vaginal bleeding.   There were no changes to her medical or surgical history since her last visit. Marital Status: .  She is sexually active. She has not had new partners.. STD testing recommendations have been explained to the patient and she declines STD testing.    The patient would like to discuss the following complaints today: nothing    Additional OB/GYN History   On HRT? No    Last Pap : 2023. Results: negative. HPV:  not done .   Last Completed Pap Smear       This patient has no relevant Health Maintenance data.          History of abnormal Pap smear: no  Family history of uterine, colon, breast, or ovarian cancer: no  Performs monthly Self-Breast Exam: yes  Last mammogram: 2023. Done at .    Last Completed Mammogram            MAMMOGRAM (Yearly) Next due on 2024  Mammo Screening Digital Tomosynthesis Bilateral With CAD    2022  Mammo Screening Digital Tomosynthesis Bilateral With CAD    2021  Mammo Screening Digital Tomosynthesis Bilateral With CAD    10/15/2020  Mammo Screening Digital Tomosynthesis Bilateral With CAD    2019  Mammo Screening Digital Tomosynthesis Bilateral With CAD    Only the first 5 history entries have been loaded, but more history exists.                  Last colonoscopy: has had a colonoscopy 3.5 years ago    Last Completed Colonoscopy            COLORECTAL CANCER SCREENING (COLONOSCOPY - Every 10 Years) Next due on 10/13/2030      10/13/2020  SCANNED - COLONOSCOPY                    Her last bone density scan was 2 years ago and results were Osteopenia  Exercises Regularly: yes  Feelings of Anxiety or Depression: no      Tobacco Usage?: No       Current Outpatient Medications:      CALCIUM PO, Take 2 capsules by mouth 2 (Two) Times a Day., Disp: , Rfl:     Multiple Vitamins-Minerals (Algae Based Calcium) tablet, Take 1 capsule by mouth Daily., Disp: , Rfl:     Patient denies the need for medication refills today.    OB History          2    Para   2    Term   2            AB        Living             SAB        IAB        Ectopic        Molar        Multiple        Live Births                    Past Medical History:   Diagnosis Date    Bile reflux esophagitis     IBS (irritable bowel syndrome)     Osteopenia         Past Surgical History:   Procedure Laterality Date    COLONOSCOPY      ENDOSCOPY      TIBIAL PLATEAU OPEN REDUCTION INTERNAL FIXATION Left 2020    Procedure: OPEN REDUCTION INTERNAL FIXATION LEFT LATERAL TIBIAL PLATEAU FRACTURE;  Surgeon: Gerard Zheng MD;  Location: [facility];  Service: Orthopedics;  Laterality: Left;    UPPER GASTROINTESTINAL ENDOSCOPY      WISDOM TOOTH EXTRACTION Bilateral        Health Maintenance   Topic Date Due    DXA SCAN  Never done    TDAP/TD VACCINES (1 - Tdap) Never done    ZOSTER VACCINE (1 of 2) Never done    HEPATITIS C SCREENING  Never done    ANNUAL PHYSICAL  Never done    PAP SMEAR  2024    Annual Gynecologic Pelvic and Breast Exam  2024    MAMMOGRAM  2025    COLORECTAL CANCER SCREENING  10/13/2030    COVID-19 Vaccine  Completed    INFLUENZA VACCINE  Completed    Pneumococcal Vaccine 0-64  Aged Out       The additional following portions of the patient's history were reviewed and updated as appropriate: allergies, current medications, past family history, past medical history, past social history, and past surgical history.    Review of Systems   Constitutional: Negative.    HENT: Negative.     Eyes: Negative.    Respiratory: Negative.     Cardiovascular: Negative.    Gastrointestinal: Negative.    Endocrine: Negative.    Genitourinary: Negative.    Musculoskeletal: Negative.    Skin:  "Negative.    Allergic/Immunologic: Negative.    Neurological: Negative.    Hematological: Negative.    Psychiatric/Behavioral: Negative.         I have reviewed and agree with the HPI, ROS, and historical information as entered above. Cody Rojo MD      Objective   /70   Ht 162.6 cm (64\")   Wt 54 kg (119 lb)   LMP  (LMP Unknown)   BMI 20.43 kg/m²     Physical Exam  Vitals and nursing note reviewed. Exam conducted with a chaperone present.   Constitutional:       Appearance: She is well-developed.   HENT:      Head: Normocephalic and atraumatic.   Neck:      Thyroid: No thyroid mass or thyromegaly.   Cardiovascular:      Rate and Rhythm: Normal rate and regular rhythm.      Heart sounds: No murmur heard.  Pulmonary:      Effort: Pulmonary effort is normal. No retractions.      Breath sounds: Normal breath sounds. No wheezing, rhonchi or rales.   Chest:      Chest wall: No mass or tenderness.   Breasts:     Right: Normal. No mass, nipple discharge, skin change or tenderness.      Left: Normal. No mass, nipple discharge, skin change or tenderness.   Abdominal:      General: Bowel sounds are normal.      Palpations: Abdomen is soft. Abdomen is not rigid. There is no mass.      Tenderness: There is no abdominal tenderness. There is no guarding.      Hernia: No hernia is present. There is no hernia in the left inguinal area.   Genitourinary:     Labia:         Right: No rash, tenderness or lesion.         Left: No rash, tenderness or lesion.       Vagina: Normal. No vaginal discharge or lesions.      Cervix: No cervical motion tenderness, discharge, lesion or cervical bleeding.      Uterus: Normal. Not enlarged, not fixed and not tender.       Adnexa:         Right: No mass or tenderness.          Left: No mass or tenderness.        Rectum: No external hemorrhoid.   Musculoskeletal:      Cervical back: Normal range of motion. No muscular tenderness.   Neurological:      Mental Status: She is alert and " oriented to person, place, and time.   Psychiatric:         Behavior: Behavior normal.            Assessment and Plan    Problem List Items Addressed This Visit    None  Visit Diagnoses       Pap test, as part of routine gynecological examination    -  Primary            GYN annual well woman exam.   Reviewed monthly self breast exams.  Instructed to call with lumps, pain, or breast discharge.  Yearly mammograms ordered.  Ordered mammogram today.  Recommended use of Vitamin D and getting adequate calcium in her diet. (1500mg)  Reviewed exercise as a preventative health measures.   Reviewed BMI and weight loss as preventative health measures.   Colonoscopy recommended.  RTC in 1 year or PRN with problems.  Other: has had abd pain   Return in about 1 year (around 3/5/2025).         Cody Rojo MD  03/05/2024

## 2024-03-12 LAB — REF LAB TEST METHOD: NORMAL

## 2024-04-10 ENCOUNTER — OFFICE VISIT (OUTPATIENT)
Dept: GASTROENTEROLOGY | Facility: CLINIC | Age: 59
End: 2024-04-10
Payer: COMMERCIAL

## 2024-04-10 VITALS
BODY MASS INDEX: 20.49 KG/M2 | OXYGEN SATURATION: 98 % | HEIGHT: 64 IN | HEART RATE: 62 BPM | TEMPERATURE: 98.6 F | WEIGHT: 120 LBS

## 2024-04-10 DIAGNOSIS — K59.89 VISCERAL HYPERSENSITIVITY SYNDROME: ICD-10-CM

## 2024-04-10 DIAGNOSIS — K30 FUNCTIONAL DYSPEPSIA: ICD-10-CM

## 2024-04-10 DIAGNOSIS — R11.10 REGURGITATION OF FOOD: Primary | ICD-10-CM

## 2024-04-10 DIAGNOSIS — K90.89 BILE SALT-INDUCED DIARRHEA: ICD-10-CM

## 2024-04-10 DIAGNOSIS — Z86.010 HISTORY OF ADENOMATOUS POLYP OF COLON: ICD-10-CM

## 2024-04-10 DIAGNOSIS — R11.10 RUMINATION: ICD-10-CM

## 2024-04-10 PROCEDURE — 99214 OFFICE O/P EST MOD 30 MIN: CPT | Performed by: INTERNAL MEDICINE

## 2024-04-10 RX ORDER — ERYTHROMYCIN 250 MG/1
250 TABLET, COATED ORAL 3 TIMES DAILY
Qty: 90 TABLET | Refills: 6 | Status: SHIPPED | OUTPATIENT
Start: 2024-04-10

## 2024-04-10 NOTE — PROGRESS NOTES
GASTROENTEROLOGY OFFICE NOTE  Toya Apple  [id number]  1965      Chief Complaint   Patient presents with    Rumination Syndrome, Gastroesophageal Reflux Disease        HISTORY OF PRESENT ILLNESS:  Patient presents for follow-up of abdominal bloating and dyspeptic symptoms which has been the subject of extensive GI workup which has been nondiagnostic.  When she was last seen it was felt that perhaps she has some element of rumination syndrome and she was counseled to start diaphragmatic breathing exercises.  She states she did this and has not found it to be particularly helpful although she states that overall she is better than she was 1 and 2 years ago.  She continues to regurgitate food sometimes ingested well over 4 hours previously.  She had 2 episodes of nocturnal regurgitation in the past week.  Similar complaints were noted at her last visit.  She developed an upper abdominal pressure like feeling mostly in the left upper quadrant which is resolved and improved with belching.  Symptoms occur on a daily basis.  She denies dysphagia, she denies odynophagia.  She has no true early satiety and denies any unexplained weight loss.  She is not noting any problems with melena nor bright red blood per rectum.    Low FODMAP diet, trial of metoclopramide have been unsuccessful in the past.  She has had normal gastric emptying scan and nondiagnostic endoscopic studies.  Mirtazapine was not tolerated.    She does have a history of adenomatous colonic polyps and is due for surveillance colonoscopy 2025.    She has had problems with bile salt induced diarrhea which have responded to colestipol/Colestid.    PAST MEDICAL HISTORY  Past Medical History:    Bile reflux esophagitis    GERD (gastroesophageal reflux disease)    IBS (irritable bowel syndrome)    Osteopenia        PAST SURGICAL HISTORY  Past Surgical History:    COLONOSCOPY    ENDOSCOPY    TIBIAL PLATEAU OPEN REDUCTION INTERNAL FIXATION    Procedure: OPEN  "REDUCTION INTERNAL FIXATION LEFT LATERAL TIBIAL PLATEAU FRACTURE;  Surgeon: Gerard Zheng MD;  Location: [facility];  Service: Orthopedics;  Laterality: Left;    UPPER GASTROINTESTINAL ENDOSCOPY    WISDOM TOOTH EXTRACTION        MEDICATIONS:    Current Outpatient Medications:     CALCIUM PO, Take 2 capsules by mouth 2 (Two) Times a Day., Disp: , Rfl:     Multiple Vitamins-Minerals (Algae Based Calcium) tablet, Take 1 capsule by mouth Daily., Disp: , Rfl:     ALLERGIES  has No Known Allergies.    FAMILY HISTORY:  Cancer-related family history is negative for Breast cancer, Ovarian cancer, Endometrial cancer, Uterine cancer, and Colon cancer. She was adopted.  Colon Cancer-related family history is negative for Colon cancer. She was adopted.    SOCIAL HISTORY  She  reports that she has never smoked. She has never used smokeless tobacco. She reports that she does not drink alcohol and does not use drugs.       PHYSICAL EXAM   Pulse 62   Temp 98.6 °F (37 °C) (Infrared)   Ht 162.6 cm (64\")   Wt 54.4 kg (120 lb)   LMP  (LMP Unknown)   SpO2 98%   BMI 20.60 kg/m²   General: Pleasant, no apparent acute distress.  Alert and oriented.  HEENT: Anicteric sclera  Lungs: Grossly normal respiration without labored breathing or audible wheezing noted.  Speaking in full sentences  Abdomen: Without gross or obvious distention  Neurologic: Normal cognition and affect.  Alert and oriented      ASSESSMENT  1.-Functional dyspepsia suggestive of gastroparesis.  Some elements suggestive of rumination syndrome not improved with diaphragmatic breathing exercises.  2.-Suspected gastroparesis.  She very clearly states that she can regurgitate food ingested more than 4 hours prior and in fact this seems to be her biggest complaint.  We reviewed with her various prokinetic agents available she is already been intolerant of metoclopramide we reviewed erythromycin (and its risk for yeast infections), domperidone, cisapride and also " reviewed briefly pyloric Botox.  She is open to a trial of erythromycin  3.-Gastroesophageal reflux disease.  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 3 times daily  2.-Consider trial of domperidone.  She understands it is not FDA approved  3.-Return appointment  4.-Surveillance colonoscopy due October 2025      Jay Crystal MD  4/10/2024   15:21 EDT

## 2024-08-12 ENCOUNTER — OFFICE VISIT (OUTPATIENT)
Dept: GASTROENTEROLOGY | Facility: CLINIC | Age: 59
End: 2024-08-12
Payer: COMMERCIAL

## 2024-08-12 VITALS
HEART RATE: 66 BPM | BODY MASS INDEX: 19.74 KG/M2 | DIASTOLIC BLOOD PRESSURE: 76 MMHG | WEIGHT: 115 LBS | SYSTOLIC BLOOD PRESSURE: 138 MMHG

## 2024-08-12 DIAGNOSIS — E74.31 CONGENITAL SUCRASE-ISOMALTASE DEFICIENCY: ICD-10-CM

## 2024-08-12 DIAGNOSIS — R11.10 REGURGITATION OF FOOD: Primary | ICD-10-CM

## 2024-08-12 DIAGNOSIS — Z86.010 HISTORY OF ADENOMATOUS POLYP OF COLON: ICD-10-CM

## 2024-08-12 PROCEDURE — 99213 OFFICE O/P EST LOW 20 MIN: CPT

## 2024-08-12 RX ORDER — SACROSIDASE 8500 [IU]/ML
17000 SOLUTION ORAL 3 TIMES DAILY
Qty: 1620 ML | Refills: 3 | Status: SHIPPED | OUTPATIENT
Start: 2024-08-12 | End: 2024-08-13

## 2024-08-12 NOTE — PROGRESS NOTES
Office Note     Name: Toya Apple    : 1965     MRN: [id number]     Chief Complaint  Regurgitation of food     Subjective     History of Present Illness:  Toya Apple is a 59 y.o. female who presents today for follow-up of gastroparesis, which has been managed by Dr. Crystal.  She has had a lot of abdominal bloating and dyspeptic symptoms, with extensive nondiagnostic GI workup.  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  She has history of negative gastric emptying study, however is consistently continuing to reflux foods that was ingested over 4 hours prior.  She does not have early satiety or unexplained weight loss.  She denies any melena.  Prior EGD 2021 with Dr. Lang unremarkable, and suggestive of bile reflux.    She has had therapeutic failure to low FODMAP diet, Reglan.  She has also been treated for visceral hypersensitivity syndrome with mirtazapine, which was not tolerated.    She has a history of adenomatous colonic polyps, and is due for repeat surveillance in .      Past Medical History:   Past Medical History:   Diagnosis Date    Bile reflux esophagitis     GERD (gastroesophageal reflux disease)     IBS (irritable bowel syndrome)     Osteopenia        Past Surgical History:   Past Surgical History:   Procedure Laterality Date    COLONOSCOPY      ENDOSCOPY      TIBIAL PLATEAU OPEN REDUCTION INTERNAL FIXATION Left 2020    Procedure: OPEN REDUCTION INTERNAL FIXATION LEFT LATERAL TIBIAL PLATEAU FRACTURE;  Surgeon: Gerard Zheng MD;  Location: [facility];  Service: Orthopedics;  Laterality: Left;    UPPER GASTROINTESTINAL ENDOSCOPY      WISDOM TOOTH EXTRACTION Bilateral        Immunizations:   Immunization History   Administered Date(s) Administered    COVID-19 (MODERNA) Monovalent Original Booster 2022    COVID-19 (PFIZER)  "Purple Cap Monovalent 03/25/2021, 04/20/2021, 12/22/2021    COVID-19 F23 (PFIZER) 12YRS+ (COMIRNATY) 09/28/2023        Medications:     Current Outpatient Medications:     CALCIUM PO, Take 2 capsules by mouth 2 (Two) Times a Day., Disp: , Rfl:     erythromycin base (E-MYCIN) 250 MG tablet, Take 1 tablet by mouth 3 (Three) Times a Day., Disp: 90 tablet, Rfl: 6    Multiple Vitamins-Minerals (Algae Based Calcium) tablet, Take 1 capsule by mouth Daily., Disp: , Rfl:     Sucraid 8500 UNIT/ML solution, Take 17,000 Units by mouth 3 times a day. Sacrosidase 8500 units/ml solution. Dose: 2 ml oral 6 times daily. Quantity: 1080 ml. Refills: 11. Take 2 ml by mouth 6 times a day. Dispense 540 single use containers., Disp: 1620 mL, Rfl: 3    Allergies:   No Known Allergies    Family History:   Family History   Adopted: Yes   Problem Relation Age of Onset    No Known Problems Father     No Known Problems Mother     Breast cancer Neg Hx     Ovarian cancer Neg Hx     Endometrial cancer Neg Hx     Uterine cancer Neg Hx     Colon cancer Neg Hx     Stroke Neg Hx        Social History:   Social History     Socioeconomic History    Marital status:    Tobacco Use    Smoking status: Never    Smokeless tobacco: Never   Vaping Use    Vaping status: Never Used   Substance and Sexual Activity    Alcohol use: Never    Drug use: Never    Sexual activity: Yes     Partners: Male     Birth control/protection: Post-menopausal         Objective     Vital Signs  /76 (BP Location: Right arm, Patient Position: Sitting, Cuff Size: Adult)   Pulse 66   Wt 52.2 kg (115 lb)   BMI 19.74 kg/m²   Estimated body mass index is 19.74 kg/m² as calculated from the following:    Height as of 4/10/24: 162.6 cm (64\").    Weight as of this encounter: 52.2 kg (115 lb).    BMI is within normal parameters. No other follow-up for BMI required.      Physical Exam  Vitals reviewed.   Constitutional:       General: She is awake.   Cardiovascular:      Rate and " Rhythm: Normal rate.   Pulmonary:      Effort: Pulmonary effort is normal.   Abdominal:      Palpations: Abdomen is soft.      Tenderness: There is no abdominal tenderness.   Skin:     General: Skin is warm and dry.   Neurological:      Mental Status: She is alert.          Assessment and Plan     Assessment & Plan  Regurgitation of food  No improvement on erythromycin.  Will plan to try domperidone 10 mg 3 times daily.  Patient understands that this is not FDA approved.  Samples of Creon provided in office.  She will let me know if this improves her symptoms at all.    History of adenomatous polyp of colon  Due for repeat colonoscopy in 2025      Congenital sucrase-isomaltase deficiency  Tested positive for this previously but was prohibitively expensive a few years ago when she first tried to get it. Will try running it again.          New Medications Ordered This Visit   Medications    Sucraid 8500 UNIT/ML solution     Sig: Take 17,000 Units by mouth 3 times a day. Sacrosidase 8500 units/ml solution. Dose: 2 ml oral 6 times daily. Quantity: 1080 ml. Refills: 11. Take 2 ml by mouth 6 times a day. Dispense 540 single use containers.     Dispense:  1620 mL     Refill:  3          Follow Up  No follow-ups on file.    LAUREEN Bruce  [facility] [address]  [facility] GASTROENTEROLOGY  [address] [id number]

## 2024-08-13 RX ORDER — SACROSIDASE 8500 [IU]/ML
SOLUTION ORAL
Qty: 1620 ML | Refills: 3 | Status: SHIPPED | OUTPATIENT
Start: 2024-08-13

## 2024-09-06 ENCOUNTER — PRIOR AUTHORIZATION (OUTPATIENT)
Dept: GASTROENTEROLOGY | Facility: CLINIC | Age: 59
End: 2024-09-06
Payer: COMMERCIAL

## 2024-09-11 ENCOUNTER — OFFICE VISIT (OUTPATIENT)
Dept: GASTROENTEROLOGY | Facility: CLINIC | Age: 59
End: 2024-09-11
Payer: COMMERCIAL

## 2024-09-11 VITALS — BODY MASS INDEX: 19.6 KG/M2 | WEIGHT: 114.8 LBS | HEIGHT: 64 IN

## 2024-09-11 DIAGNOSIS — K90.89 BILE SALT-INDUCED DIARRHEA: ICD-10-CM

## 2024-09-11 DIAGNOSIS — R11.10 REGURGITATION OF FOOD: Primary | ICD-10-CM

## 2024-09-11 DIAGNOSIS — K30 FUNCTIONAL DYSPEPSIA: ICD-10-CM

## 2024-09-11 DIAGNOSIS — Z86.010 HISTORY OF ADENOMATOUS POLYP OF COLON: ICD-10-CM

## 2024-09-11 DIAGNOSIS — K21.9 GASTROESOPHAGEAL REFLUX DISEASE WITHOUT ESOPHAGITIS: ICD-10-CM

## 2024-09-11 DIAGNOSIS — R11.10 RUMINATION: ICD-10-CM

## 2024-09-11 DIAGNOSIS — K59.89 VISCERAL HYPERSENSITIVITY SYNDROME: ICD-10-CM

## 2024-09-11 DIAGNOSIS — E74.31 CONGENITAL SUCRASE-ISOMALTASE DEFICIENCY: ICD-10-CM

## 2024-09-11 NOTE — PROGRESS NOTES
GASTROENTEROLOGY OFFICE NOTE  Toya Apple  [id number]  1965      Chief Complaint   Patient presents with    Regurgitation of Food         HISTORY OF PRESENT ILLNESS:  Follow-up appointment for this very pleasant 59-year-old white female with extensive GI workup for dyspeptic symptoms of unclear etiology felt to be related to perhaps idiopathic gastroparesis.  She has failed to respond to dietary modification for gastroparesis and continues to complain of regurgitation.  She states that she can wake up in the melanite when she turns and then will have an acute left upper quadrant abdominal discomfort.  She has failed low FODMAP diet, metoclopramide.  Treatment for visceral hyperintensities syndrome with mirtazapine was not tolerated.    Sucrose deficiency is being contemplated and she most recently saw LAUREEN Bruce here in this office and is awaiting approval of the Sucraid.    She is without dysphagia to solids or odynophagia.  She has some elements of early satiety but no unexplained weight loss and is not reporting any melena nor bright red blood per rectum.  She has bile salt induced diarrhea responsive to colestipol.    She has a history of adenomatous colon polyps.  Surveillance colonoscopy is due in 2025.    PAST MEDICAL HISTORY  Past Medical History:    Bile reflux esophagitis    GERD (gastroesophageal reflux disease)    IBS (irritable bowel syndrome)    Osteopenia        PAST SURGICAL HISTORY  Past Surgical History:    COLONOSCOPY    ENDOSCOPY    TIBIAL PLATEAU OPEN REDUCTION INTERNAL FIXATION    Procedure: OPEN REDUCTION INTERNAL FIXATION LEFT LATERAL TIBIAL PLATEAU FRACTURE;  Surgeon: Gerard Zheng MD;  Location: [facility];  Service: Orthopedics;  Laterality: Left;    UPPER GASTROINTESTINAL ENDOSCOPY    WISDOM TOOTH EXTRACTION        MEDICATIONS:    Current Outpatient Medications:     CALCIUM PO, Take 2 capsules by mouth 2 (Two) Times a Day., Disp: , Rfl:     Multiple  "Vitamins-Minerals (Algae Based Calcium) tablet, Take 1 capsule by mouth Daily., Disp: , Rfl:     erythromycin base (E-MYCIN) 250 MG tablet, Take 1 tablet by mouth 3 (Three) Times a Day. (Patient not taking: Reported on 9/11/2024), Disp: 90 tablet, Rfl: 6    Sucraid 8500 UNIT/ML solution, Sacrosidase 8500 units/ml solution. Dose: 2 ml oral 6 times daily. Quantity: 1080 ml. Refills: 11. Take 2 ml by mouth 6 times a day. Dispense 540 single use containers. (Patient not taking: Reported on 9/11/2024), Disp: 1620 mL, Rfl: 3    ALLERGIES  has No Known Allergies.    FAMILY HISTORY:  Cancer-related family history is negative for Breast cancer, Ovarian cancer, Endometrial cancer, Uterine cancer, and Colon cancer. She was adopted.  Colon Cancer-related family history is negative for Colon cancer. She was adopted.    SOCIAL HISTORY  She  reports that she has never smoked. She has never used smokeless tobacco. She reports that she does not drink alcohol and does not use drugs.       PHYSICAL EXAM   Ht 162.6 cm (64\")   Wt 52.1 kg (114 lb 12.8 oz)   LMP  (LMP Unknown)   BMI 19.71 kg/m²   General: Pleasant, no apparent acute distress.  Alert and oriented.  HEENT: Anicteric sclera  Lungs: Grossly normal respiration without labored breathing or audible wheezing noted.  Speaking in full sentences  Abdomen: Without gross or obvious distention  Neurologic: Normal cognition and affect.  Alert and oriented       ASSESSMENT  1.-Chronic dyspeptic symptoms with extensive nondiagnostic workup and multiple empiric therapeutic trial failures.  2.-Suspected gastroparesis  3.-GERD  4.-Bile salt induced diarrhea responsive to Colestid  5.-History of adenomatous colonic polyps due for surveillance colonoscopy 2025    PLAN  1.-Initiate trial of Elavil 12.5 to 50 mg p.o. nightly titrated to response and tolerance  2.-Colonoscopy due next year  3.-Follow-up appointment in 3 to 4 months  4.-If Sucraid is approved I agree with trial of " Prince Crystal MD  9/11/2024   15:13 EDT

## 2025-01-02 ENCOUNTER — TRANSCRIBE ORDERS (OUTPATIENT)
Dept: ADMINISTRATIVE | Facility: HOSPITAL | Age: 60
End: 2025-01-02
Payer: COMMERCIAL

## 2025-01-02 DIAGNOSIS — Z12.31 SCREENING MAMMOGRAM FOR BREAST CANCER: Primary | ICD-10-CM

## 2025-02-04 ENCOUNTER — HOSPITAL ENCOUNTER (OUTPATIENT)
Dept: MAMMOGRAPHY | Facility: HOSPITAL | Age: 60
Discharge: HOME | End: 2025-02-04
Admitting: OBSTETRICS & GYNECOLOGY
Payer: COMMERCIAL

## 2025-02-04 DIAGNOSIS — Z12.31 SCREENING MAMMOGRAM FOR BREAST CANCER: ICD-10-CM

## 2025-02-04 LAB
NCCN CRITERIA FLAG: NORMAL
TYRER CUZICK SCORE: 5.4

## 2025-02-04 PROCEDURE — 77063 BREAST TOMOSYNTHESIS BI: CPT

## 2025-02-04 PROCEDURE — 77067 SCR MAMMO BI INCL CAD: CPT

## 2025-02-05 PROCEDURE — 77067 SCR MAMMO BI INCL CAD: CPT | Performed by: RADIOLOGY

## 2025-02-05 PROCEDURE — 77063 BREAST TOMOSYNTHESIS BI: CPT | Performed by: RADIOLOGY

## 2025-03-10 ENCOUNTER — TELEPHONE (OUTPATIENT)
Dept: GASTROENTEROLOGY | Facility: CLINIC | Age: 60
End: 2025-03-10
Payer: COMMERCIAL

## 2025-03-10 NOTE — TELEPHONE ENCOUNTER
Hub staff attempted to follow warm transfer process and was unsuccessful     Caller: [facility] - [facility] - [place], [place] - [address] - [phone]  - [phone]    Relationship to patient: Pharmacy    Best call back number:  [phone]    Patient is needing: PHARMACY IS ASKING IF PT NEEDS APPEAL ASSISTANCE FOR SUCRAID.  PLEASE SENT OFFICE NOTES, LABS AND MEDICAL REASON TO ABOVE FAX.

## 2025-03-17 NOTE — TELEPHONE ENCOUNTER
4 Day prescription for Sucraid sent to pharmacy as a trial. If effective a appeal will be started.

## 2025-03-18 ENCOUNTER — OFFICE VISIT (OUTPATIENT)
Dept: OBSTETRICS AND GYNECOLOGY | Facility: CLINIC | Age: 60
End: 2025-03-18
Payer: COMMERCIAL

## 2025-03-18 VITALS
HEIGHT: 64 IN | WEIGHT: 111.2 LBS | SYSTOLIC BLOOD PRESSURE: 116 MMHG | DIASTOLIC BLOOD PRESSURE: 66 MMHG | BODY MASS INDEX: 18.98 KG/M2

## 2025-03-18 DIAGNOSIS — Z01.419 WELL WOMAN EXAM WITH ROUTINE GYNECOLOGICAL EXAM: Primary | ICD-10-CM

## 2025-03-18 PROCEDURE — 99396 PREV VISIT EST AGE 40-64: CPT | Performed by: NURSE PRACTITIONER

## 2025-03-18 RX ORDER — OMEPRAZOLE 40 MG/1
40 CAPSULE, DELAYED RELEASE ORAL DAILY
COMMUNITY
Start: 2025-03-13 | End: 2025-03-18

## 2025-03-18 RX ORDER — CHLORAL HYDRATE 500 MG
1000 CAPSULE ORAL
COMMUNITY

## 2025-03-18 NOTE — PROGRESS NOTES
Gynecologic Annual Exam Note        GYN Annual Exam     CC - Here for annual exam.        HPI  Toya Apple is a 59 y.o. female, , who presents for annual well woman exam as a established patient.  She is postmenopausal. Denies vaginal bleeding.   There were no changes to her medical or surgical history since her last visit.     Marital Status: .  She is sexually active. She has not had new partners. STD testing recommendations have been explained to the patient and she declines STD testing.    The patient would like to discuss the following complaints today: None.    Additional OB/GYN History   On HRT? No    Last Pap : 3/5/24. Results: negative. HPV:  not done .   Last Completed Pap Smear    This patient has no relevant Health Maintenance data.       History of abnormal Pap smear: no  Family history of uterine, colon, breast, or ovarian cancer:  Unsure- patient is adopted.  Performs monthly Self-Breast Exam: no  Last mammogram: 25. Done at . There is a copy in the chart.    Last Completed Mammogram            Upcoming       MAMMOGRAM (Every 2 Years) Next due on 2025  Mammo Screening Digital Tomosynthesis Bilateral With CAD    2024  Mammo Screening Digital Tomosynthesis Bilateral With CAD    2022  Mammo Screening Digital Tomosynthesis Bilateral With CAD    2021  Mammo Screening Digital Tomosynthesis Bilateral With CAD    10/15/2020  Mammo Screening Digital Tomosynthesis Bilateral With CAD     Only the first 5 history entries have been loaded, but more history exists.                        Last colonoscopy: has had a colonoscopy 1 month ago    Last Completed Colonoscopy            Needs Review       COLORECTAL CANCER SCREENING (COLONOSCOPY - Every 10 Years) Tentatively due on 2025  COLONOSCOPY    10/13/2020  SCANNED - COLONOSCOPY                            Her last bone density scan was 2-3 years ago per patient and results  were Osteopenia.   Exercises Regularly: yes  Feelings of Anxiety or Depression: yes - anxiety that she reports is self-manageable.       Tobacco Usage?: No       Current Outpatient Medications:     CALCIUM PO, Take 2 capsules by mouth 2 (Two) Times a Day., Disp: , Rfl:     Multiple Vitamins-Minerals (Algae Based Calcium) tablet, Take 1 capsule by mouth Daily., Disp: , Rfl:     Omega-3 Fatty Acids (fish oil) 1000 MG capsule capsule, Take 1 capsule by mouth Daily With Breakfast., Disp: , Rfl:     Turmeric (QC TUMERIC COMPLEX PO), Take 1 tablet by mouth twice a day., Disp: , Rfl:     Patient denies the need for medication refills today.    OB History          2    Para   2    Term   2            AB        Living             SAB        IAB        Ectopic        Molar        Multiple        Live Births   2                Past Medical History:   Diagnosis Date    Bile reflux esophagitis     GERD (gastroesophageal reflux disease)     IBS (irritable bowel syndrome)     Osteopenia     Varicella     Had it as a child        Past Surgical History:   Procedure Laterality Date    COLONOSCOPY      ENDOSCOPY      TIBIAL PLATEAU OPEN REDUCTION INTERNAL FIXATION Left 2020    Procedure: OPEN REDUCTION INTERNAL FIXATION LEFT LATERAL TIBIAL PLATEAU FRACTURE;  Surgeon: Gerard Zheng MD;  Location: [facility];  Service: Orthopedics;  Laterality: Left;    UPPER GASTROINTESTINAL ENDOSCOPY      WISDOM TOOTH EXTRACTION Bilateral        Health Maintenance   Topic Date Due    Pneumococcal Vaccine 50+ (1 of 1 - PCV) Never done    HEPATITIS C SCREENING  Never done    ANNUAL PHYSICAL  Never done    TDAP/TD VACCINES (2 - Td or Tdap) 2021    PAP SMEAR  2025    Annual Gynecologic Pelvic and Breast Exam  2025    ZOSTER VACCINE (2 of 2) 2025    MAMMOGRAM  2027    COLORECTAL CANCER SCREENING  2035    COVID-19 Vaccine  Completed    INFLUENZA VACCINE  Completed       The additional  "following portions of the patient's history were reviewed and updated as appropriate: allergies, current medications, past family history, past medical history, past social history, past surgical history, and problem list.    Review of Systems   Constitutional: Negative.    HENT: Negative.     Eyes: Negative.    Respiratory: Negative.     Cardiovascular: Negative.    Gastrointestinal: Negative.    Endocrine: Negative.    Genitourinary: Negative.    Musculoskeletal: Negative.    Skin: Negative.    Allergic/Immunologic: Negative.    Neurological: Negative.    Hematological: Negative.    Psychiatric/Behavioral: Negative.         I have reviewed and agree with the HPI, ROS, and historical information as entered above. Alka BEAVERS Varsha, APRN      Objective   /66   Ht 162.6 cm (64\")   Wt 50.4 kg (111 lb 3.2 oz)   LMP  (LMP Unknown)   BMI 19.09 kg/m²     Physical Exam  Vitals and nursing note reviewed. Exam conducted with a chaperone present.   Constitutional:       Appearance: Normal appearance. She is well-developed.   HENT:      Head: Normocephalic and atraumatic.   Neck:      Thyroid: No thyroid mass or thyromegaly.   Cardiovascular:      Rate and Rhythm: Normal rate.      Heart sounds: No murmur heard.  Pulmonary:      Effort: Pulmonary effort is normal. No retractions.      Breath sounds: No wheezing, rhonchi or rales.   Chest:      Chest wall: No mass or tenderness.   Breasts:     Right: Normal. No mass, nipple discharge, skin change or tenderness.      Left: Normal. No mass, nipple discharge, skin change or tenderness.   Abdominal:      Palpations: Abdomen is soft. Abdomen is not rigid. There is no mass.      Tenderness: There is no abdominal tenderness. There is no guarding.      Hernia: No hernia is present.   Genitourinary:     General: Normal vulva.      Exam position: Lithotomy position.      Labia:         Right: No rash, tenderness or lesion.         Left: No rash, tenderness or lesion.       " Vagina: Normal. No vaginal discharge or lesions.      Cervix: No cervical motion tenderness, discharge, lesion or cervical bleeding.      Uterus: Normal. Not enlarged, not fixed and not tender.       Adnexa: Right adnexa normal and left adnexa normal.        Right: No mass or tenderness.          Left: No mass or tenderness.        Rectum: Normal. No external hemorrhoid.   Musculoskeletal:      Cervical back: Normal range of motion. No muscular tenderness.   Neurological:      Mental Status: She is alert and oriented to person, place, and time.   Psychiatric:         Behavior: Behavior normal.            Assessment and Plan    Problem List Items Addressed This Visit    None  Visit Diagnoses         Well woman exam with routine gynecological exam    -  Primary    Relevant Orders    LIQUID-BASED PAP SMEAR WITH HPV GENOTYPING IF ASCUS ([facility])            GYN annual well woman exam.   Pap guidelines reviewed. Pap today.  Reviewed monthly self breast exams.  Instructed to call with lumps, pain, or breast discharge.  Yearly mammograms ordered.  Recommended use of Vitamin D and getting adequate calcium in her diet. (1500mg)  Reviewed exercise as a preventative health measures.   Recommended Flu Vaccine in Fall of each year.  RTC in 1 year or PRN with problems.  Return in about 1 year (around 3/18/2026) for Annual physical.         Alka Maddox, APRN  03/18/2025

## 2025-03-19 LAB — REF LAB TEST METHOD: NORMAL

## (undated) DEVICE — SYS SKIN CLS DERMABOND PRINEO W/22CM MESH TP

## (undated) DEVICE — DISPOSABLE TOURNIQUET CUFF SINGLE BLADDER, DUAL PORT AND QUICK CONNECT CONNECTOR: Brand: COLOR CUFF

## (undated) DEVICE — GOWN,REINF,POLY,ECL,PP SLV,XL: Brand: MEDLINE

## (undated) DEVICE — APPL CHLORAPREP TINTED 26ML TEAL

## (undated) DEVICE — GW THRD SPADE PT NO/COLR 1.6X150MM

## (undated) DEVICE — Device

## (undated) DEVICE — PAD GRND REM POLYHESIVE A/ DISP

## (undated) DEVICE — SCRW CORT S/TAP 3.5X38MM
Type: IMPLANTABLE DEVICE | Site: TIBIA | Status: NON-FUNCTIONAL
Removed: 2020-05-14

## (undated) DEVICE — BNDG ELAS W/CLIP 6IN 10YD LF STRL

## (undated) DEVICE — GLV SURG BIOGEL LTX PF 8

## (undated) DEVICE — BIT DRL QC DIA 2.5X110MM

## (undated) DEVICE — ANTIBACTERIAL UNDYED BRAIDED (POLYGLACTIN 910), SYNTHETIC ABSORBABLE SUTURE: Brand: COATED VICRYL

## (undated) DEVICE — 3M™ WARMING BLANKET, UPPER BODY, 10 PER CASE, 42268: Brand: BAIR HUGGER™

## (undated) DEVICE — GLV SURG PREMIERPRO MIC LTX PF SZ8 BRN

## (undated) DEVICE — 3M™ STERI-DRAPE™ INSTRUMENT POUCH 1018: Brand: STERI-DRAPE™

## (undated) DEVICE — PAD ARMBRD SURG CONVOL 7.5X20X2IN

## (undated) DEVICE — PK EXTREM LOWR 10

## (undated) DEVICE — BRACE KN XACT/ROM TELESCP ADJ UNIV

## (undated) DEVICE — PUMP PAIN AUTOFUSER AUTO SELCT NOBOLUS 1TO14ML/HR 550ML DISP